# Patient Record
Sex: FEMALE | Race: WHITE | Employment: UNEMPLOYED | URBAN - METROPOLITAN AREA
[De-identification: names, ages, dates, MRNs, and addresses within clinical notes are randomized per-mention and may not be internally consistent; named-entity substitution may affect disease eponyms.]

---

## 2017-05-12 ENCOUNTER — TELEPHONE (OUTPATIENT)
Dept: INTERNAL MEDICINE CLINIC | Age: 57
End: 2017-05-12

## 2017-05-12 RX ORDER — ESTRADIOL 1 MG/1
1 TABLET ORAL DAILY
Qty: 30 TAB | Refills: 1 | Status: SHIPPED | OUTPATIENT
Start: 2017-05-12 | End: 2017-08-14 | Stop reason: SDUPTHER

## 2017-05-12 NOTE — TELEPHONE ENCOUNTER
Can cut estrace in 1/2 (take 0.5 mg).  Give it 1 month, then can try taking 1/2 pill every other day

## 2017-05-12 NOTE — TELEPHONE ENCOUNTER
Patient would like for the nurse to call her regarding getting off of estradiol (ESTRACE) 1 mg tablet  She wants to know if she has to winged off or can she just stop taking?  Her [phone] would like a call back

## 2017-05-14 RX ORDER — ESTRADIOL 1 MG/1
TABLET ORAL
Qty: 90 TAB | Refills: 3 | Status: SHIPPED | OUTPATIENT
Start: 2017-05-14 | End: 2017-09-06 | Stop reason: SDUPTHER

## 2017-08-14 NOTE — TELEPHONE ENCOUNTER
Forwarded refill request for estradiol to her GYN , Dr. Juliano Padilla. Possible interaction w lamictal (decreased effectiveness), but patient does not take lamictal for seizure d/o.

## 2017-08-15 RX ORDER — ESTRADIOL 1 MG/1
TABLET ORAL
Qty: 30 TAB | Refills: 4 | Status: SHIPPED | OUTPATIENT
Start: 2017-08-15 | End: 2018-01-22

## 2017-08-15 NOTE — TELEPHONE ENCOUNTER
Ashlie Gomez MD   You 5 hours ago (9:15 AM)                 Fill until December (Routing comment)

## 2017-08-15 NOTE — TELEPHONE ENCOUNTER
She is not due until December 2017, but Dr. Ken Mendoza who sent the request to you, refilled her for 1 year back on 5/14/2017. I am confused.

## 2017-09-06 RX ORDER — ESTRADIOL 1 MG/1
TABLET ORAL
Qty: 90 TAB | Refills: 3 | Status: SHIPPED | OUTPATIENT
Start: 2017-09-06 | End: 2018-01-22

## 2017-12-13 ENCOUNTER — TELEPHONE (OUTPATIENT)
Dept: OBGYN CLINIC | Age: 57
End: 2017-12-13

## 2017-12-13 NOTE — TELEPHONE ENCOUNTER
Call received at 11:35am      61year old patient last seen in the office on 12/15/16 and has next appointment for AE on 12/18/17. Patient calling to ask about whether she needs to be fasting to have HIV and STD testing done. This nurse advised patient that she did not need to be fasting for this lab work. Patient verbalized understanding.

## 2018-01-22 ENCOUNTER — OFFICE VISIT (OUTPATIENT)
Dept: OBGYN CLINIC | Age: 58
End: 2018-01-22

## 2018-01-22 VITALS
SYSTOLIC BLOOD PRESSURE: 144 MMHG | DIASTOLIC BLOOD PRESSURE: 90 MMHG | HEIGHT: 64 IN | WEIGHT: 186.6 LBS | BODY MASS INDEX: 31.86 KG/M2

## 2018-01-22 DIAGNOSIS — Z01.419 ENCOUNTER FOR GYNECOLOGICAL EXAMINATION WITHOUT ABNORMAL FINDING: Primary | ICD-10-CM

## 2018-01-22 DIAGNOSIS — Z11.3 SCREENING EXAMINATION FOR VENEREAL DISEASE: ICD-10-CM

## 2018-01-22 NOTE — PROGRESS NOTES
Danitza Sanchez is a ,  61 y.o. female [facility]– [place] whose No LMP recorded. Patient has had a hysterectomy. who presents for her annual checkup. She is having no significant problems. She requested STD testing, no concerns or symptoms. Hormone Status:    She is not having vasomotor symptoms. The patient is using HRT: none     Sexual history:    She  reports that she does not currently engage in sexual activity. Medical conditions:    Since her last annual GYN exam about 12/15/2016 ago, she has had the following changes in her health history: none. Pap and Mammogram History:    Her most recent Pap smear was normal/-HPV obtained 2014 year(s) ago. The patient had her mammogram today in our office. Breast Cancer History/Substance Abuse:    She has no family history of breast cancer. Osteoporosis History:    Family history does not include a first or second degree relative with osteopenia or osteoporosis. A bone density scan was not obtained. She is currently not taking calcium and vit D. Past Medical History:   Diagnosis Date    Abnormal Pap smear h/o per pt- unknown age    s/p HANSEL for fibroid. normal     Chronic anxiety     Dr. Hilbert Lombard.  Chronic depression     Dr. Hay Woodward H/O gastric bypass 2005    peak wt prior 312 lb, low 165 lb (?)    Hx of vertigo     did PT in the past    IFG (impaired fasting glucose)     a1c 5.8% 2013,     Left lumbar radiculopathy     xray showed DJD per pt    Neuropathy of foot     prior to gastric bypass. +FH,mildly painful, bilateral, stocking distribution, saw neurology in [place]    KEELEY on CPAP     improved w weight loss, but still uses CPAP    Tinnitus of both ears     for years     Past Surgical History:   Procedure Laterality Date    HX CHOLECYSTECTOMY      HX COLONOSCOPY  14    normal.  repeat 3 yrs +FH,  in [place] (3rd colonoscopy)    HX COLONOSCOPY  ?  HX COLONOSCOPY  ?     HX COLONOSCOPY 10/10/2016    normal    HX GASTRIC BYPASS  2005    HX HYSTERECTOMY  2011 Estil Schaumann pt    due to fibroids, still has ovaries    HX ORTHOPAEDIC      middle finger right hand    HX ORTHOPAEDIC  2013    fx fibula right leg    HX TONSILLECTOMY  1964     Tobacco History:  reports that she has never smoked. She has never used smokeless tobacco.  Alcohol Abuse:  reports that she drinks alcohol. Drug Abuse:  reports that she does not use illicit drugs. Current Outpatient Prescriptions   Medication Sig Dispense Refill    estradiol (ESTRACE) 1 mg tablet TAKE 1 TAB BY MOUTH DAILY. 90 Tab 3    estradiol (ESTRACE) 1 mg tablet TAKE 1 TAB BY MOUTH DAILY. 30 Tab 4    DULoxetine (CYMBALTA) 60 mg capsule Take 1 Cap by mouth two (2) times a day. 120 Cap 1    lamoTRIgine (LAMICTAL) 25 mg tablet Take 50 mg by mouth daily.  ASPIRIN (ASPIR-81 PO) Take  by mouth.  LORazepam (ATIVAN) 1 mg tablet Take 1 mg by mouth every four (4) hours as needed for Anxiety.  zolpidem (AMBIEN) 5 mg tablet Take  by mouth. 1/2 tab po every day PRN      MULTIVIT &MINERALS/FERROUS FUM (MULTI VITAMIN PO) Take  by mouth daily.  cyanocobalamin (VITAMIN B-12) 1,000 mcg sublingual tablet Take 1,000 mcg by mouth daily.  ASCORBATE CALCIUM (VITAMIN C PO) Take  by mouth.  zinc 50 mg tab tablet Take  by mouth daily.  ferrous sulfate (IRON) 325 mg (65 mg iron) tablet Take  by mouth Daily (before breakfast).  calcium-vitamin D 600 mg(1,500mg) -200 unit tab Take 1 tablet by mouth two (2) times daily (with meals).  BIOTIN PO Take  by mouth daily.  DOCOSAHEXANOIC ACID/EPA (FISH OIL PO) Take  by mouth daily.  clotrimazole-betamethasone (LOTRISONE) topical cream Apply  to affected area two (2) times a day.  15 g 0     Allergies: Prednisone and Wellbutrin [bupropion hcl]   Social History     Social History    Marital status: SINGLE     Spouse name:  2014    Number of children: 1    Years of education: N/A Occupational History    [employer]. Friend of Antony Sue      Social History Main Topics    Smoking status: Never Smoker    Smokeless tobacco: Never Used    Alcohol use Yes      Comment: less than one drink per week    Drug use: No    Sexual activity: Not Currently     Other Topics Concern    Not on file     Social History Narrative    Son in [place].  Age 32.  she  Moved to [facility] 2/2014     Patient Active Problem List   Diagnosis Code    Chronic anxiety F41.9    Chronic depression F32.9    KEELEY on CPAP G47.33, Z99.89    Neuropathy of foot G57.90    H/O gastric bypass Z98.890    IFG (impaired fasting glucose) R73.01    Left lumbar radiculopathy M54.16    Tinnitus of both ears H93.13       Review of Systems - History obtained from the patient  Constitutional: negative for weight loss, fever, night sweats  HEENT: negative for hearing loss, earache, congestion, snoring, sorethroat  CV: negative for chest pain, palpitations, edema  Resp: negative for cough, shortness of breath, wheezing  GI: negative for change in bowel habits, abdominal pain, black or bloody stools  : negative for frequency, dysuria, hematuria, vaginal discharge  MSK: negative for back pain, joint pain, muscle pain  Breast: negative for breast lumps, nipple discharge, galactorrhea  Skin :negative for itching, rash, hives  Neuro: negative for dizziness, headache, confusion, weakness  Psych: negative for anxiety, depression, change in mood  Heme/lymph: negative for bleeding, bruising, pallor    Physical Exam    Visit Vitals    /90    Ht 5' 4\" (1.626 m)    Wt 186 lb 9.6 oz (84.6 kg)    BMI 32.03 kg/m2     Constitutional  · Appearance: well-nourished, well developed, alert, in no acute distress    HENT  · Head and Face: appears normal    Neck  · Inspection/Palpation: normal appearance, no masses or tenderness  · Lymph Nodes: no lymphadenopathy present  · Thyroid: gland size normal, nontender, no nodules or masses present on palpation    Chest  · Respiratory Effort: breathing normal        Auscultation: normal breath sounds    Cardiovascular  · Heart:  · Auscultation: regular rate and rhythm without murmur    Breasts  · Inspection of Breasts: breasts symmetrical, no skin changes, no discharge present, nipple appearance normal, no skin retraction present  · Palpation of Breasts and Axillae: no masses present on palpation, no breast tenderness  · Axillary Lymph Nodes: no lymphadenopathy present    Gastrointestinal  · Abdominal Examination: abdomen non-tender to palpation, normal bowel sounds, no masses present  · Liver and spleen: no hepatomegaly present, spleen not palpable  · Hernias: no hernias identified    Genitourinary  · External Genitalia: normal appearance for age, no discharge present, no tenderness present, no inflammatory lesions present, no masses present, no atrophy present  · Vagina: normal vaginal vault without central or paravaginal defects, no discharge present, no inflammatory lesions present, no masses present  · Bladder: non-tender to palpation  · Urethra: appears normal  · Cervix: absent  · Uterus: absent  · Adnexa: no adnexal tenderness present, no adnexal masses present  · Perineum: perineum within normal limits, no evidence of trauma, no rashes or skin lesions present  · Anus: anus within normal limits, no hemorrhoids present  · Inguinal Lymph Nodes: no lymphadenopathy present      Skin  · General Inspection: no rash, no lesions identified    Neurologic/Psychiatric  · Mental Status:  · Orientation: grossly oriented to person, place and time  · Mood and Affect: mood normal, affect appropriate    . Assessment:  Routine gynecologic examination  Her current medical status is satisfactory with no evidence of significant gynecologic issues.     Plan:  Counseled re: diet, exercise, healthy lifestyle  Return for yearly wellness visits  Rec annual mammogram  STD testing by request

## 2018-01-22 NOTE — PATIENT INSTRUCTIONS
Breast Self-Exam: Care Instructions  Your Care Instructions    A breast self-exam is when you check your breasts for lumps or changes. This regular exam helps you learn how your breasts normally look and feel. Most breast problems or changes are not because of cancer. Breast self-exam is not a substitute for a mammogram. Having regular breast exams by your doctor and regular mammograms improve your chances of finding any problems with your breasts. Some women set a time each month to do a step-by-step breast self-exam. Other women like a less formal system. They might look at their breasts as they brush their teeth, or feel their breasts once in a while in the shower. If you notice a change in your breast, tell your doctor. Follow-up care is a key part of your treatment and safety. Be sure to make and go to all appointments, and call your doctor if you are having problems. It's also a good idea to know your test results and keep a list of the medicines you take. How do you do a breast self-exam?  · The best time to examine your breasts is usually one week after your menstrual period begins. Your breasts should not be tender then. If you do not have periods, you might do your exam on a day of the month that is easy to remember. · To examine your breasts:  ¨ Remove all your clothes above the waist and lie down. When you are lying down, your breast tissue spreads evenly over your chest wall, which makes it easier to feel all your breast tissue. ¨ Use the pads-not the fingertips-of the 3 middle fingers of your left hand to check your right breast. Move your fingers slowly in small coin-sized circles that overlap. ¨ Use three levels of pressure to feel of all your breast tissue. Use light pressure to feel the tissue close to the skin surface. Use medium pressure to feel a little deeper. Use firm pressure to feel your tissue close to your breastbone and ribs.  Use each pressure level to feel your breast tissue before moving on to the next spot. ¨ Check your entire breast, moving up and down as if following a strip from the collarbone to the bra line, and from the armpit to the ribs. Repeat until you have covered the entire breast.  ¨ Repeat this procedure for your left breast, using the pads of the 3 middle fingers of your right hand. · To examine your breasts while in the shower:  ¨ Place one arm over your head and lightly soap your breast on that side. ¨ Using the pads of your fingers, gently move your hand over your breast (in the strip pattern described above), feeling carefully for any lumps or changes. ¨ Repeat for the other breast.  · Have your doctor inspect anything you notice to see if you need further testing. Where can you learn more? Go to http://sascha-hunter.info/. Enter P148 in the search box to learn more about \"Breast Self-Exam: Care Instructions. \"  Current as of: May 12, 2017  Content Version: 11.4  © 3060-1000 [employer]. Care instructions adapted under license by [employer] (which disclaims liability or warranty for this information). If you have questions about a medical condition or this instruction, always ask your healthcare professional. [place] [address] any warranty or liability for your use of this information.

## 2018-01-23 LAB
COMMENT, 144067: NORMAL
HBV SURFACE AG SERPL QL IA: NEGATIVE
HCV AB S/CO SERPL IA: <0.1 S/CO RATIO (ref 0–0.9)
HIV 1+2 AB+HIV1 P24 AG SERPL QL IA: NON REACTIVE
HSV2 IGG SER IA-ACNC: <0.91 INDEX (ref 0–0.9)
RPR SER QL: NON REACTIVE

## 2018-01-24 LAB
C TRACH RRNA SPEC QL NAA+PROBE: NEGATIVE
N GONORRHOEA RRNA SPEC QL NAA+PROBE: NEGATIVE
T VAGINALIS RRNA SPEC QL NAA+PROBE: NEGATIVE

## 2018-01-25 ENCOUNTER — TELEPHONE (OUTPATIENT)
Dept: OBGYN CLINIC | Age: 58
End: 2018-01-25

## 2018-01-25 NOTE — TELEPHONE ENCOUNTER
Called and spoke with patient regarding STD blood work and GC culture results--all normal/negative. Pt states understanding.

## 2018-01-25 NOTE — TELEPHONE ENCOUNTER
----- Message from Jewels Marshall sent at 1/25/2018  8:24 AM EST -----  Regarding: RE:[facility] After Visit Follow-Up Message. Contact: [phone]  I would like to review my test results with you. I can be reached [phone]   Thanks. ... Precious Fabian  ----- Message -----  From: Russell Elkins MD  Sent: 1/25/2018 12:00 AM EST  To: Sj Coffey.   Subject: [facility] After Visit Follow-Up Message. Amy Ms. Snyder,    Thank you for your recent visit to [id number] [place] [address]. Your health is important to us and we are privileged to be your healthcare provider and partner. If you have follow-up questions regarding your treatment plan from todays visit, please contact us through the [facility] Patient Portal by replying to this message. In the near future, you may receive a survey about your experience with us. We hope you will take the time to let us know how we did so we can continue to improve the care you receive.       Thank you,    Melody Salinas Ob-gyn

## 2018-03-02 ENCOUNTER — OFFICE VISIT (OUTPATIENT)
Dept: INTERNAL MEDICINE CLINIC | Age: 58
End: 2018-03-02

## 2018-03-02 VITALS
TEMPERATURE: 99.1 F | BODY MASS INDEX: 31.58 KG/M2 | HEART RATE: 98 BPM | SYSTOLIC BLOOD PRESSURE: 199 MMHG | OXYGEN SATURATION: 98 % | DIASTOLIC BLOOD PRESSURE: 108 MMHG | WEIGHT: 185 LBS | HEIGHT: 64 IN | RESPIRATION RATE: 16 BRPM

## 2018-03-02 DIAGNOSIS — I10 HTN (HYPERTENSION), MALIGNANT: Primary | ICD-10-CM

## 2018-03-02 RX ORDER — LISINOPRIL AND HYDROCHLOROTHIAZIDE 10; 12.5 MG/1; MG/1
1 TABLET ORAL DAILY
Qty: 30 TAB | Refills: 2 | Status: SHIPPED | OUTPATIENT
Start: 2018-03-02 | End: 2018-03-28 | Stop reason: ALTCHOICE

## 2018-03-02 RX ORDER — DULOXETIN HYDROCHLORIDE 60 MG/1
120 CAPSULE, DELAYED RELEASE ORAL DAILY
COMMUNITY
End: 2019-05-20 | Stop reason: ALTCHOICE

## 2018-03-02 RX ORDER — TRAZODONE HYDROCHLORIDE 50 MG/1
1 TABLET ORAL AS NEEDED
COMMUNITY
Start: 2018-02-22

## 2018-03-02 RX ORDER — LAMOTRIGINE 100 MG/1
200 TABLET ORAL DAILY
Refills: 0 | COMMUNITY
Start: 2017-11-27 | End: 2021-02-10 | Stop reason: ALTCHOICE

## 2018-03-02 NOTE — PATIENT INSTRUCTIONS
High Blood Pressure: Care Instructions  Your Care Instructions    If your blood pressure is usually above 140/90, you have high blood pressure, or hypertension. That means the top number is 140 or higher or the bottom number is 90 or higher, or both. Despite what a lot of people think, high blood pressure usually doesn't cause headaches or make you feel dizzy or lightheaded. It usually has no symptoms. But it does increase your risk for heart attack, stroke, and kidney or eye damage. The higher your blood pressure, the more your risk increases. Your doctor will give you a goal for your blood pressure. Your goal will be based on your health and your age. An example of a goal is to keep your blood pressure below 140/90. Lifestyle changes, such as eating healthy and being active, are always important to help lower blood pressure. You might also take medicine to reach your blood pressure goal.  Follow-up care is a key part of your treatment and safety. Be sure to make and go to all appointments, and call your doctor if you are having problems. It's also a good idea to know your test results and keep a list of the medicines you take. How can you care for yourself at home? Medical treatment  · If you stop taking your medicine, your blood pressure will go back up. You may take one or more types of medicine to lower your blood pressure. Be safe with medicines. Take your medicine exactly as prescribed. Call your doctor if you think you are having a problem with your medicine. · Talk to your doctor before you start taking aspirin every day. Aspirin can help certain people lower their risk of a heart attack or stroke. But taking aspirin isn't right for everyone, because it can cause serious bleeding. · See your doctor regularly. You may need to see the doctor more often at first or until your blood pressure comes down.   · If you are taking blood pressure medicine, talk to your doctor before you take decongestants or anti-inflammatory medicine, such as ibuprofen. Some of these medicines can raise blood pressure. · Learn how to check your blood pressure at home. Lifestyle changes  · Stay at a healthy weight. This is especially important if you put on weight around the waist. Losing even 10 pounds can help you lower your blood pressure. · If your doctor recommends it, get more exercise. Walking is a good choice. Bit by bit, increase the amount you walk every day. Try for at least 30 minutes on most days of the week. You also may want to swim, bike, or do other activities. · Avoid or limit alcohol. Talk to your doctor about whether you can drink any alcohol. · Try to limit how much sodium you eat to less than 2,300 milligrams (mg) a day. Your doctor may ask you to try to eat less than 1,500 mg a day. · Eat plenty of fruits (such as bananas and oranges), vegetables, legumes, whole grains, and low-fat dairy products. · Lower the amount of saturated fat in your diet. Saturated fat is found in animal products such as milk, cheese, and meat. Limiting these foods may help you lose weight and also lower your risk for heart disease. · Do not smoke. Smoking increases your risk for heart attack and stroke. If you need help quitting, talk to your doctor about stop-smoking programs and medicines. These can increase your chances of quitting for good. When should you call for help? Call 911 anytime you think you may need emergency care. This may mean having symptoms that suggest that your blood pressure is causing a serious heart or blood vessel problem. Your blood pressure may be over 180/110. ? For example, call 911 if:  ? · You have symptoms of a heart attack. These may include:  ¨ Chest pain or pressure, or a strange feeling in the chest.  ¨ Sweating. ¨ Shortness of breath. ¨ Nausea or vomiting.   ¨ Pain, pressure, or a strange feeling in the back, neck, jaw, or upper belly or in one or both shoulders or arms.  ¨ Lightheadedness or sudden weakness. ¨ A fast or irregular heartbeat. ? · You have symptoms of a stroke. These may include:  ¨ Sudden numbness, tingling, weakness, or loss of movement in your face, arm, or leg, especially on only one side of your body. ¨ Sudden vision changes. ¨ Sudden trouble speaking. ¨ Sudden confusion or trouble understanding simple statements. ¨ Sudden problems with walking or balance. ¨ A sudden, severe headache that is different from past headaches. ? · You have severe back or belly pain. ?Do not wait until your blood pressure comes down on its own. Get help right away. ?Call your doctor now or seek immediate care if:  ? · Your blood pressure is much higher than normal (such as 180/110 or higher), but you don't have symptoms. ? · You think high blood pressure is causing symptoms, such as:  ¨ Severe headache. ¨ Blurry vision. ? Watch closely for changes in your health, and be sure to contact your doctor if:  ? · Your blood pressure measures 140/90 or higher at least 2 times. That means the top number is 140 or higher or the bottom number is 90 or higher, or both. ? · You think you may be having side effects from your blood pressure medicine. ? · Your blood pressure is usually normal, but it goes above normal at least 2 times. Where can you learn more? Go to http://sascha-hunter.info/. Enter E633 in the search box to learn more about \"High Blood Pressure: Care Instructions. \"  Current as of: September 21, 2016  Content Version: 11.4  © 5218-0857 [employer]. Care instructions adapted under license by [employer] (which disclaims liability or warranty for this information). If you have questions about a medical condition or this instruction, always ask your healthcare professional. [place] [address] any warranty or liability for your use of this information.

## 2018-03-02 NOTE — MR AVS SNAPSHOT
[facility] 
 
 
 [address]  [place] [address] [facility] 
[phone] Patient: Moy Dodge MRN: [id number] [id number] Visit Information Date & Time Provider Department Dept. Phone Encounter #  
 3/2/2018  2:45 PM Rashi Laboy MD [facility] [address] [facility] [id number] Your Appointments 3/28/2018  2:45 PM  
Complete Physical with Anjel Diaz MD  
[facility] App[facility] Note: CPE, STD Testing, AB, 11/22/17; CPE, STD Testing, AB, 11/22/17   r/s from 02/01/2018  julia  
 [address]  [id number]  
[phone]  
  
   
 [address] [facility] [address] Upcoming Health Maintenance Date Due Influenza Age 5 to Adult 8/1/2017 DTaP/Tdap/Td series (1 - Tdap) 7/2/2023* BREAST CANCER SCRN MAMMOGRAM 1/22/2019 COLONOSCOPY 10/10/2019 PAP AKA CERVICAL CYTOLOGY 12/12/2019 *Topic was postponed. The date shown is not the original due date. Allergies as of 3/2/2018  Review Complete On: 3/2/2018 By: Geraldine Smith LPN Severity Noted Reaction Type Reactions Prednisone  11/26/2014    Other (comments) Pt feels like she has an \"out of body\" experience Wellbutrin [Bupropion Hcl]  12/14/2015    Palpitations Current Immunizations  Reviewed on 12/15/2016 Name Date Influenza Vaccine 9/14/2016, 9/14/2016, 10/7/2015, 9/25/2014 Td 7/2/2013 Not reviewed this visit You Were Diagnosed With   
  
 Codes Comments HTN (hypertension), malignant    -  Primary ICD-10-CM: I10 
ICD-9-CM: 401.0 Vitals BP Pulse Temp Resp Height(growth percentile) Weight(growth percentile) (!) 199/108 (BP 1 Location: Left arm, BP Patient Position: Sitting) 98 99.1 °F (37.3 °C) (Oral) 16 5' 4\" (1.626 m) 185 lb (83.9 kg) SpO2 BMI OB Status Smoking Status 98% 31.76 kg/m2 Hysterectomy Never Smoker Vitals History BMI and BSA Data Body Mass Index Body Surface Area 31.76 kg/m 2 1.95 m 2 Preferred Pharmacy [facility] Phone [facility] [address] [phone] Your Updated Medication List  
  
   
This list is accurate as of 3/2/18  3:36 PM.  Always use your most recent med list.  
  
  
  
  
 ASPIR-81 PO Take  by mouth. ATIVAN 1 mg tablet Generic drug:  LORazepam  
Take 1 mg by mouth every four (4) hours as needed for Anxiety. BIOTIN PO Take  by mouth daily. calcium-vitamin D 600 mg(1,500mg) -200 unit Tab Take 1 tablet by mouth two (2) times daily (with meals). CYMBALTA 60 mg capsule Generic drug:  DULoxetine Take 120 mg by mouth daily. FISH OIL PO Take  by mouth daily. Iron 325 mg (65 mg iron) tablet Generic drug:  ferrous sulfate Take  by mouth Daily (before breakfast). lamoTRIgine 100 mg tablet Commonly known as: LaMICtal  
Take 1.5 Tabs by mouth daily. lisinopril-hydroCHLOROthiazide 10-12.5 mg per tablet Commonly known as:  Nicholaus Cable Take 1 Tab by mouth daily. MULTI VITAMIN PO Take  by mouth daily. PROBIOTIC PO Take  by mouth. traZODone 50 mg tablet Commonly known as:  Jayro Gupta Take 1 Tab by mouth daily. VITAMIN B-12 1,000 mcg sublingual tablet Generic drug:  cyanocobalamin Take 1,000 mcg by mouth daily. VITAMIN C PO Take  by mouth. zinc 50 mg Tab tablet Take  by mouth daily. Prescriptions Sent to Pharmacy Refills  
 lisinopril-hydroCHLOROthiazide (PRINZIDE, ZESTORETIC) 10-12.5 mg per tablet 2 Sig: Take 1 Tab by mouth daily. Class: Normal  
 Pharmacy: [facility], [address] [place] [address] Ph #: [phone] Route: Oral  
  
We Performed the Following METABOLIC PANEL, COMPREHENSIVE [78871 CPT(R)] Patient Instructions High Blood Pressure: Care Instructions Your Care Instructions If your blood pressure is usually above 140/90, you have high blood pressure, or hypertension. That means the top number is 140 or higher or the bottom number is 90 or higher, or both. Despite what a lot of people think, high blood pressure usually doesn't cause headaches or make you feel dizzy or lightheaded. It usually has no symptoms. But it does increase your risk for heart attack, stroke, and kidney or eye damage. The higher your blood pressure, the more your risk increases. Your doctor will give you a goal for your blood pressure. Your goal will be based on your health and your age. An example of a goal is to keep your blood pressure below 140/90. Lifestyle changes, such as eating healthy and being active, are always important to help lower blood pressure. You might also take medicine to reach your blood pressure goal. 
Follow-up care is a key part of your treatment and safety. Be sure to make and go to all appointments, and call your doctor if you are having problems. It's also a good idea to know your test results and keep a list of the medicines you take. How can you care for yourself at home? Medical treatment · If you stop taking your medicine, your blood pressure will go back up. You may take one or more types of medicine to lower your blood pressure. Be safe with medicines. Take your medicine exactly as prescribed. Call your doctor if you think you are having a problem with your medicine. · Talk to your doctor before you start taking aspirin every day. Aspirin can help certain people lower their risk of a heart attack or stroke. But taking aspirin isn't right for everyone, because it can cause serious bleeding. · See your doctor regularly. You may need to see the doctor more often at first or until your blood pressure comes down.  
· If you are taking blood pressure medicine, talk to your doctor before you take decongestants or anti-inflammatory medicine, such as ibuprofen. Some of these medicines can raise blood pressure. · Learn how to check your blood pressure at home. Lifestyle changes · Stay at a healthy weight. This is especially important if you put on weight around the waist. Losing even 10 pounds can help you lower your blood pressure. · If your doctor recommends it, get more exercise. Walking is a good choice. Bit by bit, increase the amount you walk every day. Try for at least 30 minutes on most days of the week. You also may want to swim, bike, or do other activities. · Avoid or limit alcohol. Talk to your doctor about whether you can drink any alcohol. · Try to limit how much sodium you eat to less than 2,300 milligrams (mg) a day. Your doctor may ask you to try to eat less than 1,500 mg a day. · Eat plenty of fruits (such as bananas and oranges), vegetables, legumes, whole grains, and low-fat dairy products. · Lower the amount of saturated fat in your diet. Saturated fat is found in animal products such as milk, cheese, and meat. Limiting these foods may help you lose weight and also lower your risk for heart disease. · Do not smoke. Smoking increases your risk for heart attack and stroke. If you need help quitting, talk to your doctor about stop-smoking programs and medicines. These can increase your chances of quitting for good. When should you call for help? Call 911 anytime you think you may need emergency care. This may mean having symptoms that suggest that your blood pressure is causing a serious heart or blood vessel problem. Your blood pressure may be over 180/110. ? For example, call 911 if: 
? · You have symptoms of a heart attack. These may include: ¨ Chest pain or pressure, or a strange feeling in the chest. 
¨ Sweating. ¨ Shortness of breath. ¨ Nausea or vomiting.  
¨ Pain, pressure, or a strange feeling in the back, neck, jaw, or upper belly or in one or both shoulders or arms. ¨ Lightheadedness or sudden weakness. ¨ A fast or irregular heartbeat. ? · You have symptoms of a stroke. These may include: 
¨ Sudden numbness, tingling, weakness, or loss of movement in your face, arm, or leg, especially on only one side of your body. ¨ Sudden vision changes. ¨ Sudden trouble speaking. ¨ Sudden confusion or trouble understanding simple statements. ¨ Sudden problems with walking or balance. ¨ A sudden, severe headache that is different from past headaches. ? · You have severe back or belly pain. ?Do not wait until your blood pressure comes down on its own. Get help right away. ?Call your doctor now or seek immediate care if: 
? · Your blood pressure is much higher than normal (such as 180/110 or higher), but you don't have symptoms. ? · You think high blood pressure is causing symptoms, such as: ¨ Severe headache. ¨ Blurry vision. ? Watch closely for changes in your health, and be sure to contact your doctor if: 
? · Your blood pressure measures 140/90 or higher at least 2 times. That means the top number is 140 or higher or the bottom number is 90 or higher, or both. ? · You think you may be having side effects from your blood pressure medicine. ? · Your blood pressure is usually normal, but it goes above normal at least 2 times. Where can you learn more? Go to http://sascha-hunter.info/. Enter R082 in the search box to learn more about \"High Blood Pressure: Care Instructions. \" Current as of: September 21, 2016 Content Version: 11.4 © 6999-0560 [employer]. Care instructions adapted under license by [employer] (which disclaims liability or warranty for this information). If you have questions about a medical condition or this instruction, always ask your healthcare professional. [address] any warranty or liability for your use of this information. Introducing [facility] & HEALTH SERVICES! Dear Sandra Zuniga: Thank you for requesting a [employer] account. Our records indicate that you already have an active [employer] account. You can access your account anytime at [email or web address] Did you know that you can access your hospital and ER discharge instructions at any time in [employer]? You can also review all of your test results from your hospital stay or ER visit. Additional Information If you have questions, please visit the Frequently Asked Questions section of the [employer] website at [email or web address] Remember, [employer] is NOT to be used for urgent needs. For medical emergencies, dial 911. Now available from your iPhone and Android! Please provide this summary of care documentation to your next provider. Your primary care clinician is listed as Katie Gomez. If you have any questions after today's visit, please call [phone].

## 2018-03-03 LAB
ALBUMIN SERPL-MCNC: 4.7 G/DL (ref 3.5–5.5)
ALBUMIN/GLOB SERPL: 2.1 {RATIO} (ref 1.2–2.2)
ALP SERPL-CCNC: 72 IU/L (ref 39–117)
ALT SERPL-CCNC: 18 IU/L (ref 0–32)
AST SERPL-CCNC: 28 IU/L (ref 0–40)
BILIRUB SERPL-MCNC: 0.7 MG/DL (ref 0–1.2)
BUN SERPL-MCNC: 16 MG/DL (ref 6–24)
BUN/CREAT SERPL: 20 (ref 9–23)
CALCIUM SERPL-MCNC: 10 MG/DL (ref 8.7–10.2)
CHLORIDE SERPL-SCNC: 98 MMOL/L (ref 96–106)
CO2 SERPL-SCNC: 27 MMOL/L (ref 18–29)
CREAT SERPL-MCNC: 0.79 MG/DL (ref 0.57–1)
GFR SERPLBLD CREATININE-BSD FMLA CKD-EPI: 83 ML/MIN/1.73
GFR SERPLBLD CREATININE-BSD FMLA CKD-EPI: 95 ML/MIN/1.73
GLOBULIN SER CALC-MCNC: 2.2 G/DL (ref 1.5–4.5)
GLUCOSE SERPL-MCNC: 94 MG/DL (ref 65–99)
POTASSIUM SERPL-SCNC: 4 MMOL/L (ref 3.5–5.2)
PROT SERPL-MCNC: 6.9 G/DL (ref 6–8.5)
SODIUM SERPL-SCNC: 144 MMOL/L (ref 134–144)

## 2018-03-12 ENCOUNTER — OFFICE VISIT (OUTPATIENT)
Dept: INTERNAL MEDICINE CLINIC | Age: 58
End: 2018-03-12

## 2018-03-12 VITALS
RESPIRATION RATE: 16 BRPM | BODY MASS INDEX: 31.24 KG/M2 | WEIGHT: 183 LBS | HEART RATE: 87 BPM | DIASTOLIC BLOOD PRESSURE: 90 MMHG | TEMPERATURE: 98.4 F | OXYGEN SATURATION: 98 % | SYSTOLIC BLOOD PRESSURE: 170 MMHG | HEIGHT: 64 IN

## 2018-03-12 DIAGNOSIS — I10 HTN, GOAL BELOW 130/80: Primary | ICD-10-CM

## 2018-03-12 NOTE — PROGRESS NOTES
HISTORY OF PRESENT ILLNESS  Nohelia Ferreira is a 61 y.o. female. HPI  Follow up after starting Lisinopril/HCTZ 10/12.5 daily. She has tolerated it without cough. She notes a little bit of lightheadedness when she first gets to stand up, but this does not persist during the day. No chest pain or shortness of breath. Her blood pressure started off in the 200/100 range, they are not 170/90 here, at home her readings are 130-150/70-90. We have discussed having her home machine checked to be sure it is accurate. Discussed this is a good response in ten days and she's going to continue on the current dose. Discussed low salt diet. Review of Systems   Constitutional: Negative for chills, fever and weight loss. Respiratory: Negative for cough, shortness of breath and wheezing. Cardiovascular: Negative for chest pain, palpitations, orthopnea, leg swelling and PND. Gastrointestinal: Negative for heartburn and nausea. Musculoskeletal: Negative for myalgias. Neurological: Positive for dizziness. Negative for headaches. Physical Exam   Constitutional: She is oriented to person, place, and time. She appears well-developed and well-nourished. HENT:   Head: Normocephalic and atraumatic. Neck: Normal range of motion. Neck supple. Carotid bruit is not present. No thyromegaly present. Cardiovascular: Normal rate, regular rhythm, S1 normal, S2 normal, normal heart sounds and intact distal pulses. No murmur heard. Pulmonary/Chest: Effort normal and breath sounds normal. No respiratory distress. She has no wheezes. She has no rales. Musculoskeletal: She exhibits no edema. Neurological: She is alert and oriented to person, place, and time. Psychiatric: She has a normal mood and affect. Her behavior is normal.   Nursing note and vitals reviewed.       ASSESSMENT and PLAN  Diagnoses and all orders for this visit:    1. HTN, goal below 130/80    Cont prinzide 10/12.5 as already significant improvement in 10 days of use  cotn dash diet  appt in 6 weeks with dr seymour

## 2018-03-28 ENCOUNTER — OFFICE VISIT (OUTPATIENT)
Dept: INTERNAL MEDICINE CLINIC | Age: 58
End: 2018-03-28

## 2018-03-28 VITALS
TEMPERATURE: 98.1 F | HEIGHT: 64 IN | WEIGHT: 181 LBS | SYSTOLIC BLOOD PRESSURE: 147 MMHG | RESPIRATION RATE: 12 BRPM | HEART RATE: 93 BPM | BODY MASS INDEX: 30.9 KG/M2 | DIASTOLIC BLOOD PRESSURE: 82 MMHG

## 2018-03-28 DIAGNOSIS — Z98.84 H/O GASTRIC BYPASS: ICD-10-CM

## 2018-03-28 DIAGNOSIS — G47.33 OSA ON CPAP: ICD-10-CM

## 2018-03-28 DIAGNOSIS — I10 BENIGN ESSENTIAL HTN: ICD-10-CM

## 2018-03-28 DIAGNOSIS — Z99.89 OSA ON CPAP: ICD-10-CM

## 2018-03-28 DIAGNOSIS — Z00.00 WELL WOMAN EXAM (NO GYNECOLOGICAL EXAM): Primary | ICD-10-CM

## 2018-03-28 DIAGNOSIS — R73.01 IFG (IMPAIRED FASTING GLUCOSE): ICD-10-CM

## 2018-03-28 RX ORDER — LISINOPRIL AND HYDROCHLOROTHIAZIDE 12.5; 2 MG/1; MG/1
1 TABLET ORAL DAILY
Qty: 90 TAB | Refills: 0 | Status: SHIPPED | OUTPATIENT
Start: 2018-03-28 | End: 2018-06-12 | Stop reason: SDUPTHER

## 2018-03-28 NOTE — MR AVS SNAPSHOT
[address] [place] [address] 
 
 
 [address] [place] [address] 
[phone] Patient: Richard Serrano MRN: [id number] VZO:5/50/[id number] Visit Information Date & Time Provider Department Dept. Phone Encounter #  
 3/28/2018  2:45 PM Abel Lee MD [facility] [address]  [id number] Upcoming Health Maintenance Date Due Influenza Age 5 to Adult 9/1/2018* DTaP/Tdap/Td series (1 - Tdap) 7/2/2023* BREAST CANCER SCRN MAMMOGRAM 1/22/2019 PAP AKA CERVICAL CYTOLOGY 12/12/2019 COLONOSCOPY 10/10/2021 *Topic was postponed. The date shown is not the original due date. Allergies as of 3/28/2018  Review Complete On: 3/28/2018 By: Abel Lee MD  
  
 Severity Noted Reaction Type Reactions Prednisone  11/26/2014    Other (comments) Pt feels like she has an \"out of body\" experience Wellbutrin [Bupropion Hcl]  12/14/2015    Palpitations Current Immunizations  Reviewed on 12/15/2016 Name Date Influenza Vaccine 9/14/2016, 9/14/2016, 10/7/2015, 9/25/2014 Td 7/2/2013 Not reviewed this visit You Were Diagnosed With   
  
 Codes Comments Well woman exam (no gynecological exam)    -  Primary ICD-10-CM: Z00.00 ICD-9-CM: V70.0 H/O gastric bypass     ICD-10-CM: Z98.890 ICD-9-CM: V45.86 Benign essential HTN     ICD-10-CM: I10 
ICD-9-CM: 401.1 KEELEY on CPAP     ICD-10-CM: G47.33, Z99.89 ICD-9-CM: 327.23, V46.8 IFG (impaired fasting glucose)     ICD-10-CM: R73.01 
ICD-9-CM: 790.21 Vitals BP Pulse Temp Resp Height(growth percentile) Weight(growth percentile) 147/82 (BP 1 Location: Left arm, BP Patient Position: Sitting) 93 98.1 °F (36.7 °C) (Oral) 12 5' 4\" (1.626 m) 181 lb (82.1 kg) BMI OB Status Smoking Status 31.07 kg/m2 Hysterectomy Never Smoker Vitals History BMI and BSA Data Body Mass Index Body Surface Area  31.07 kg/m 2 1.93 m 2  
  
  
 Preferred Pharmacy [facility] Phone [facility] [address] [place], [address] [facility] [phone] Your Updated Medication List  
  
   
This list is accurate as of 3/28/18  3:35 PM.  Always use your most recent med list.  
  
  
  
  
 ASPIR-81 PO Take  by mouth. ATIVAN 1 mg tablet Generic drug:  LORazepam  
Take 1 mg by mouth every four (4) hours as needed for Anxiety. BIOTIN PO Take  by mouth daily. calcium-vitamin D 600 mg(1,500mg) -200 unit Tab Take 1 tablet by mouth two (2) times daily (with meals). CYMBALTA 60 mg capsule Generic drug:  DULoxetine Take 120 mg by mouth daily. FISH OIL PO Take  by mouth daily. Iron 325 mg (65 mg iron) tablet Generic drug:  ferrous sulfate Take  by mouth Daily (before breakfast). lamoTRIgine 100 mg tablet Commonly known as: LaMICtal  
Take 1.5 Tabs by mouth daily. lisinopril-hydroCHLOROthiazide 20-12.5 mg per tablet Commonly known as:  Albertine Jeanerette Take 1 Tab by mouth daily. Change in dose 3/28/18 MULTI VITAMIN PO Take  by mouth daily. PROBIOTIC PO Take  by mouth. traZODone 50 mg tablet Commonly known as:  Mosetta Volodymyr Take 1 Tab by mouth daily. VITAMIN B-12 1,000 mcg sublingual tablet Generic drug:  cyanocobalamin Take 1,000 mcg by mouth daily. VITAMIN C PO Take  by mouth. zinc 50 mg Tab tablet Take  by mouth daily. Prescriptions Sent to Pharmacy Refills  
 lisinopril-hydroCHLOROthiazide (PRINZIDE, ZESTORETIC) 20-12.5 mg per tablet 0 Sig: Take 1 Tab by mouth daily. Change in dose 3/28/18 Class: Normal  
 Pharmacy: [facility], [address] [facility] Ph #: [phone] Route: Oral  
  
We Performed the Following CBC W/O DIFF [28008 CPT(R)] COPPER [id number] CPT(R)] HEMOGLOBIN A1C WITH EAG [17374 CPT(R)] IRON PROFILE [id number] CPT(R)] LIPID PANEL [06844 CPT(R)] METABOLIC PANEL, BASIC [75731 CPT(R)] PTH INTACT [77346 CPT(R)] REFERRAL TO SLEEP STUDIES [REF99 Custom] VITAMIN B12 & FOLATE [10666 CPT(R)] VITAMIN D, 25 HYDROXY [id number] CPT(R)] ZINC [id number] CPT(R)] Referral Information Referral ID Referred By Referred To  
  
 [id number] Marisabel GUEVARA [facility] [address] Yovanny Walters 33 Visits Status Start Date End Date 1 New Request 3/28/18 3/28/19 If your referral has a status of pending review or denied, additional information will be sent to support the outcome of this decision. Introducing [facility] & HEALTH SERVICES! Dear Isaiah Corbett: Thank you for requesting a quickhuddle account. Our records indicate that you already have an active quickhuddle account. You can access your account anytime at [email or web address] Did you know that you can access your hospital and ER discharge instructions at any time in quickhuddle? You can also review all of your test results from your hospital stay or ER visit. Additional Information If you have questions, please visit the Frequently Asked Questions section of the quickhuddle website at [email or web address] Remember, quickhuddle is NOT to be used for urgent needs. For medical emergencies, dial 911. Now available from your iPhone and Android! Please provide this summary of care documentation to your next provider. Your primary care clinician is listed as Sadie Elizondo. If you have any questions after today's visit, please call [phone].

## 2018-03-28 NOTE — PROGRESS NOTES
Kai Thibodeaux is a 61 y.o. female  Presenting for her annual checkup and follow-up    Was out of of work 10/2017-18. 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 Hypertension  dx w accelerated NTN 1 month ago. Taking lisinopril-HCTZ. Reports mild dizziness. Was drinking more, but cut back to biw 2 drinks. She was using her sister's CPAP, but it  8 weeks ago. Hypertension ROS: taking medications as instructed, no medication side effects noted, no TIA's, no chest pain on exertion, no dyspnea on exertion, no swelling of ankles     reports that she has never smoked. She has never used smokeless tobacco.    reports that she drinks alcohol. BP Readings from Last 2 Encounters:   18 147/82   18 170/90     Last DEXA:   Health Maintenance   Topic Date Due    Influenza Age 5 to Adult  2018 (Originally 2017)    DTaP/Tdap/Td series (1 - Tdap) 2023 (Originally 7/3/2013)   [facility] MAMMOGRAM  2019    PAP AKA CERVICAL CYTOLOGY  2019    COLONOSCOPY  10/10/2021    Hepatitis C Screening  Completed       Exercise: moderately active  Diet: generally follows a low fat low cholesterol diet    Vaccinations reviewed  Immunization History   Administered Date(s) Administered    Influenza Vaccine 2014, 10/07/2015, 2016, 2016    Td 2013       Allergies: Prednisone and Wellbutrin [bupropion hcl]  Current Outpatient Prescriptions   Medication Sig    DULoxetine (CYMBALTA) 60 mg capsule Take 120 mg by mouth daily.  lamoTRIgine (LAMICTAL) 100 mg tablet Take 1.5 Tabs by mouth daily.  traZODone (DESYREL) 50 mg tablet Take 1 Tab by mouth daily.  LACTOBACILLUS ACIDOPHILUS (PROBIOTIC PO) Take  by mouth.  lisinopril-hydroCHLOROthiazide (PRINZIDE, ZESTORETIC) 10-12.5 mg per tablet Take 1 Tab by mouth daily.  ASPIRIN (ASPIR-81 PO) Take  by mouth.     LORazepam (ATIVAN) 1 mg tablet Take 1 mg by mouth every four (4) hours as needed for Anxiety.  MULTIVIT &MINERALS/FERROUS FUM (MULTI VITAMIN PO) Take  by mouth daily.  cyanocobalamin (VITAMIN B-12) 1,000 mcg sublingual tablet Take 1,000 mcg by mouth daily.  ASCORBATE CALCIUM (VITAMIN C PO) Take  by mouth.  zinc 50 mg tab tablet Take  by mouth daily.  ferrous sulfate (IRON) 325 mg (65 mg iron) tablet Take  by mouth Daily (before breakfast).  calcium-vitamin D 600 mg(1,500mg) -200 unit tab Take 1 tablet by mouth two (2) times daily (with meals).  BIOTIN PO Take  by mouth daily.  DOCOSAHEXANOIC ACID/EPA (FISH OIL PO) Take  by mouth daily. No current facility-administered medications for this visit. has a past medical history of Abnormal Pap smear (h/o per pt- unknown age); Benign essential HTN (3/28/2018); Chronic anxiety; Chronic depression; H/O gastric bypass (2005); vertigo; IFG (impaired fasting glucose); Left lumbar radiculopathy; Neuropathy of foot; KEELEY on CPAP; and Tinnitus of both ears. Past Surgical History:   Procedure Laterality Date    HX CHOLECYSTECTOMY  2009    HX COLONOSCOPY  1/22/14    normal.  repeat 3 yrs +,  in [place] (3rd colonoscopy)    HX COLONOSCOPY  2011?  HX COLONOSCOPY  2008?  HX COLONOSCOPY  10/10/2016    normal    HX GASTRIC BYPASS  2005    HX HYSTERECTOMY  2011 [place]  pt    due to fibroids, still has ovaries    HX ORTHOPAEDIC      middle finger right hand    HX ORTHOPAEDIC  2013    fx fibula right leg    HX TONSILLECTOMY  1964      Social History     Social History    Marital status: SINGLE     Spouse name:  2014    Number of children: 1    Years of education: N/A     Occupational History    [employer].  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 Age 32.  she  Moved to [facility] 2/2014     Family History   Problem Relation Age of Onset    Colon Cancer Mother 64     lung    Diabetes Mother     Heart Disease Mother      CHF    Psychiatric Disorder Mother     Hypertension Mother    [facility] Parkinsonism Father     Dementia Father     Hypertension Father     Hypertension Sister     Depression Sister     Heart Disease Brother     High Cholesterol Brother     Depression Son        Review of Systems - History obtained from the patient  General ROS: negative for - night sweats, weight gain or weight loss  Cardiovascular ROS: no chest pain, dyspnea on exertion, edema  GYN ROS: no breast pain or new or enlarging lumps on self exam, no vaginal bleeding. Physical exam  Blood pressure 147/82, pulse 93, temperature 98.1 °F (36.7 °C), temperature source Oral, resp. rate 12, height 5' 4\" (1.626 m), weight 181 lb (82.1 kg). Wt Readings from Last 3 Encounters:   03/28/18 181 lb (82.1 kg)   03/12/18 183 lb (83 kg)   03/02/18 185 lb (83.9 kg)     she appears well, alert and oriented x 3, pleasant and cooperative. Vitals as noted. No rashes or significant lesions. Neck supple and free of adenopathy, or masses. No thyromegaly or carotid bruits. Cranial nerves normal. Lungs are clear to auscultation. Heart sounds are normal with no murmurs, clicks, gallops or rubs. Abdomen is soft, non- tender, with no masses or organomegaly. Extremities, peripheral pulses and reflexes are normal.  . BREAST EXAM: not examined  PELVIC EXAM: examination not indicated      Diagnoses and all orders for this visit:    1. Well woman exam (no gynecological exam)  -     LIPID PANEL  -     METABOLIC PANEL, BASIC  -     CBC W/O DIFF  -     HEMOGLOBIN A1C WITH EAG  -     VITAMIN D, 25 HYDROXY  -     VITAMIN B12 & FOLATE  -     PTH INTACT  -     IRON PROFILE  -     ZINC  -     COPPER    2. H/O gastric bypass- check labs as above    3.  Benign essential HTN - suspect this due to untreated sleep apnea for the past 2 months. Needs repeat sleep study. Increase dose for now. -     lisinopril-hydroCHLOROthiazide (PRINZIDE, ZESTORETIC) 20-12.5 mg per tablet; Take 1 Tab by mouth daily. Change in dose 3/28/18    4. KEELEY on CPAP - unclear control since gastric bypass. Repeat sleep study. She prefers not to use CPAP since she is dating now. -     REFERRAL TO SLEEP STUDIES    5. IFG (impaired fasting glucose)  - The patient is asked to make an attempt to improve diet and exercise patterns to aid in medical management of this problem.   -     HEMOGLOBIN A1C WITH EAG        The patient is asked to make an attempt to improve diet and exercise patterns    Return for yearly wellness visits

## 2018-03-30 LAB
25(OH)D3+25(OH)D2 SERPL-MCNC: 38.9 NG/ML (ref 30–100)
BUN SERPL-MCNC: 17 MG/DL (ref 6–24)
BUN/CREAT SERPL: 17 (ref 9–23)
CALCIUM SERPL-MCNC: 9.9 MG/DL (ref 8.7–10.2)
CHLORIDE SERPL-SCNC: 95 MMOL/L (ref 96–106)
CHOLEST SERPL-MCNC: 247 MG/DL (ref 100–199)
CO2 SERPL-SCNC: 26 MMOL/L (ref 18–29)
COPPER SERPL-MCNC: 132 UG/DL (ref 72–166)
CREAT SERPL-MCNC: 1 MG/DL (ref 0.57–1)
ERYTHROCYTE [DISTWIDTH] IN BLOOD BY AUTOMATED COUNT: 13.8 % (ref 12.3–15.4)
EST. AVERAGE GLUCOSE BLD GHB EST-MCNC: 111 MG/DL
FOLATE SERPL-MCNC: >20 NG/ML
GFR SERPLBLD CREATININE-BSD FMLA CKD-EPI: 62 ML/MIN/1.73
GFR SERPLBLD CREATININE-BSD FMLA CKD-EPI: 72 ML/MIN/1.73
GLUCOSE SERPL-MCNC: 94 MG/DL (ref 65–99)
HBA1C MFR BLD: 5.5 % (ref 4.8–5.6)
HCT VFR BLD AUTO: 43.4 % (ref 34–46.6)
HDLC SERPL-MCNC: 93 MG/DL
HGB BLD-MCNC: 14.4 G/DL (ref 11.1–15.9)
INTERPRETATION, 910389: NORMAL
IRON SATN MFR SERPL: 22 % (ref 15–55)
IRON SERPL-MCNC: 67 UG/DL (ref 27–159)
LDLC SERPL CALC-MCNC: 131 MG/DL (ref 0–99)
MCH RBC QN AUTO: 31.5 PG (ref 26.6–33)
MCHC RBC AUTO-ENTMCNC: 33.2 G/DL (ref 31.5–35.7)
MCV RBC AUTO: 95 FL (ref 79–97)
PLATELET # BLD AUTO: 259 X10E3/UL (ref 150–379)
POTASSIUM SERPL-SCNC: 4.1 MMOL/L (ref 3.5–5.2)
PTH-INTACT SERPL-MCNC: 28 PG/ML (ref 15–65)
RBC # BLD AUTO: 4.57 X10E6/UL (ref 3.77–5.28)
SODIUM SERPL-SCNC: 140 MMOL/L (ref 134–144)
TIBC SERPL-MCNC: 304 UG/DL (ref 250–450)
TRIGL SERPL-MCNC: 113 MG/DL (ref 0–149)
UIBC SERPL-MCNC: 237 UG/DL (ref 131–425)
VIT B12 SERPL-MCNC: 1478 PG/ML (ref 232–1245)
VLDLC SERPL CALC-MCNC: 23 MG/DL (ref 5–40)
WBC # BLD AUTO: 6.5 X10E3/UL (ref 3.4–10.8)
ZINC SERPL-MCNC: 87 UG/DL (ref 56–134)

## 2018-04-09 ENCOUNTER — TELEPHONE (OUTPATIENT)
Dept: INTERNAL MEDICINE CLINIC | Age: 58
End: 2018-04-09

## 2018-04-09 NOTE — TELEPHONE ENCOUNTER
Pt states that she was in [employer] and had all signs of a heart attack and went to hospital.  Pt states that she needs to follow up with Dr. Cory Mayfield this week and have a stress test.  Leobardo Flores would like to be worked in.   Call pt back at [phone]

## 2018-04-11 ENCOUNTER — OFFICE VISIT (OUTPATIENT)
Dept: INTERNAL MEDICINE CLINIC | Age: 58
End: 2018-04-11

## 2018-04-11 VITALS
HEART RATE: 91 BPM | OXYGEN SATURATION: 96 % | DIASTOLIC BLOOD PRESSURE: 70 MMHG | HEIGHT: 64 IN | SYSTOLIC BLOOD PRESSURE: 125 MMHG | RESPIRATION RATE: 16 BRPM | TEMPERATURE: 98.5 F | BODY MASS INDEX: 31.07 KG/M2 | WEIGHT: 182 LBS

## 2018-04-11 DIAGNOSIS — I10 ESSENTIAL HYPERTENSION: ICD-10-CM

## 2018-04-11 DIAGNOSIS — R07.9 CHEST PAIN AT REST: Primary | ICD-10-CM

## 2018-04-11 NOTE — MR AVS SNAPSHOT
[address] [facility] [address] 
 
 
 [address] [facility] [place] [address] [facility] 
[phone] Patient: Amanuel Camp MRN: [id number] GLL:[id number]/85/2071 Visit Information Date & Time Provider Department Dept. Phone Encounter #  
 4/11/2018 11:15 AM Leonid Bailey MD [facility] [address]  [id number] Upcoming Health Maintenance Date Due Influenza Age 5 to Adult 9/1/2018* DTaP/Tdap/Td series (1 - Tdap) 7/2/2023* BREAST CANCER SCRN MAMMOGRAM 1/22/2019 PAP AKA CERVICAL CYTOLOGY 12/12/2019 COLONOSCOPY 10/10/2021 *Topic was postponed. The date shown is not the original due date. Allergies as of 4/11/2018  Review Complete On: 4/11/2018 By: Leonid Bailey MD  
  
 Severity Noted Reaction Type Reactions Prednisone  11/26/2014    Other (comments) Pt feels like she has an \"out of body\" experience Wellbutrin [Bupropion Hcl]  12/14/2015    Palpitations Current Immunizations  Reviewed on 12/15/2016 Name Date Influenza Vaccine 9/14/2016, 9/14/2016, 10/7/2015, 9/25/2014 Td 7/2/2013 Not reviewed this visit You Were Diagnosed With   
  
 Codes Comments Chest pain at rest    -  Primary ICD-10-CM: R07.9 ICD-9-CM: 786.50 Vitals BP Pulse Temp Resp Height(growth percentile) Weight(growth percentile) 125/70 (BP 1 Location: Left arm, BP Patient Position: Sitting) 91 98.5 °F (36.9 °C) (Oral) 16 5' 4\" (1.626 m) 182 lb (82.6 kg) SpO2 BMI OB Status Smoking Status 96% 31.24 kg/m2 Hysterectomy Never Smoker Vitals History BMI and BSA Data Body Mass Index Body Surface Area  
 31.24 kg/m 2 1.93 m 2 Preferred Pharmacy Pharmacy Name Phone [facility] [address] [facility], [address] [place] [address] [phone] Your Updated Medication List  
  
   
This list is accurate as of 4/11/18 11:40 AM.  Always use your most recent med list.  
  
  
  
  
 ASPIR-81 PO Take  by mouth. ATIVAN 1 mg tablet Generic drug:  LORazepam  
Take 1 mg by mouth every four (4) hours as needed for Anxiety. BIOTIN PO Take  by mouth daily. calcium-vitamin D 600 mg(1,500mg) -200 unit Tab Take 1 tablet by mouth two (2) times daily (with meals). CYMBALTA 60 mg capsule Generic drug:  DULoxetine Take 120 mg by mouth daily. FISH OIL PO Take  by mouth daily. Iron 325 mg (65 mg iron) tablet Generic drug:  ferrous sulfate Take  by mouth Daily (before breakfast). lamoTRIgine 100 mg tablet Commonly known as: LaMICtal  
Take 75 mg by mouth daily. lisinopril-hydroCHLOROthiazide 20-12.5 mg per tablet Commonly known as:  Gweneth Rasher Take 1 Tab by mouth daily. Change in dose 3/28/18 MULTI VITAMIN PO Take  by mouth daily. PROBIOTIC PO Take  by mouth. traZODone 50 mg tablet Commonly known as:  [place] Dart Take 1 Tab by mouth daily. VITAMIN B-12 1,000 mcg sublingual tablet Generic drug:  cyanocobalamin Take 1,000 mcg by mouth daily. VITAMIN C PO Take  by mouth. zinc 50 mg Tab tablet Take  by mouth daily. To-Do List   
 04/11/2018 ECHO:  ECHO TTE STRESS EXERCISE TREADMILL COMP   
  
 04/11/2018 ECHO:  ECHO TTE STRESS EXRCSE COMP W OR WO CONTR Introducing [facility]! Dear Ashish Escobar: Thank you for requesting a [employer] account. Our records indicate that you already have an active [employer] account. You can access your account anytime at [email or web address] Did you know that you can access your hospital and ER discharge instructions at any time in [employer]? You can also review all of your test results from your hospital stay or ER visit. Additional Information If you have questions, please visit the Frequently Asked Questions section of the [employer] website at [email or web address] Remember, [email or web address] is NOT to be used for urgent needs. For medical emergencies, dial 911. Now available from your iPhone and Android! Please provide this summary of care documentation to your next provider. Your primary care clinician is listed as Esther Ayala. If you have any questions after today's visit, please call [phone].

## 2018-04-11 NOTE — PROGRESS NOTES
HISTORY OF PRESENT ILLNESS  Palmira Reynoso is a 61 y.o. female. HPI  Patient of Dr. Jax Moore who has a history of hypertension, impaired fasting glucose, and anxiety. Comes in for further cardiac testing. She was last week in [facility]. She was in a car riding with her boyfriend and began to feel pressure in her chest, sweating all over, got shaky in arms and legs, felt dizzy, then tingly in arms and then began to have pain in jaw and neck. The episode lasted for about an hour and a half. She went to an ER, where she was given four baby aspirin and fluids and had EKG, chest xray, cardiac enzymes, all normal.  Lipase was normal.  Glucose was 120. Other labs were normal.  Symptoms gradually resolved while in the ER and she was sent home with advice to follow up for a stress test.  They drove back on Sunday and she's had no further episodes. She did have cardiac testing years ago prior to gastric bypass surgery, but has had no recent cardiac testing      Review of Systems   Constitutional: Negative for chills, diaphoresis, fever, malaise/fatigue and weight loss. Respiratory: Negative for cough, shortness of breath and wheezing. Cardiovascular: Negative for chest pain, palpitations, orthopnea, leg swelling and PND. Gastrointestinal: Negative for abdominal pain, diarrhea, heartburn and nausea. Musculoskeletal: Negative for myalgias. Neurological: Negative for dizziness and headaches. Physical Exam   Constitutional: She is oriented to person, place, and time. She appears well-developed and well-nourished. HENT:   Head: Normocephalic and atraumatic. Neck: Normal range of motion. Neck supple. Carotid bruit is not present. No thyromegaly present. Cardiovascular: Normal rate, regular rhythm, S1 normal, S2 normal, normal heart sounds and intact distal pulses. No murmur heard. Pulmonary/Chest: Effort normal and breath sounds normal. No respiratory distress. She has no wheezes. She has no rales. Abdominal: Soft. Bowel sounds are normal. She exhibits no distension and no mass. There is no tenderness. Musculoskeletal: She exhibits no edema. Neurological: She is alert and oriented to person, place, and time. Skin: No rash noted. Psychiatric: She has a normal mood and affect. Her behavior is normal.   Nursing note and vitals reviewed. ASSESSMENT and PLAN  Diagnoses and all orders for this visit:    1. Chest pain at rest-with sxs including sob neck pain and diaphoresis-seems reasonable to do further testing and stress echo ordered  No sxs since episode in [place]  Discussed to seek care urgently if recurs  She is flying to [facility] in 2 weeks and will try to get eval done soon  -     ECHO TTE STRESS EXERCISE TREADMILL COMP; Future  -     ECHO TTE STRESS EXRCSE COMP W OR WO CONTR; Future    2.  Essential hypertension  Good on current dose of bp med

## 2018-04-13 ENCOUNTER — HOSPITAL ENCOUNTER (OUTPATIENT)
Dept: NON INVASIVE DIAGNOSTICS | Age: 58
Discharge: HOME | End: 2018-04-13
Attending: INTERNAL MEDICINE
Payer: COMMERCIAL

## 2018-04-13 DIAGNOSIS — R07.9 CHEST PAIN AT REST: ICD-10-CM

## 2018-04-13 LAB
ATTENDING PHYSICIAN, CST07: NORMAL
DIAGNOSIS, 93000: NORMAL
DUKE TM SCORE RESULT, CST14: NORMAL
DUKE TREADMILL SCORE, CST13: -9
ECG INTERP BEFORE EX, CST11: NORMAL
ECG INTERP DURING EX, CST12: NORMAL
FUNCTIONAL CAPACITY, CST17: NORMAL
KNOWN CARDIAC CONDITION, CST08: NORMAL
MAX. DIASTOLIC BP, CST04: 62 MMHG
MAX. HEART RATE, CST05: 150 BPM
MAX. SYSTOLIC BP, CST03: 232 MMHG
OVERALL BP RESPONSE TO EXERCISE, CST16: NORMAL
OVERALL HR RESPONSE TO EXERCISE, CST15: NORMAL
PEAK EX METS, CST10: 12.2 METS
PROTOCOL NAME, CST01: NORMAL
TEST INDICATION, CST09: NORMAL

## 2018-04-13 PROCEDURE — 93351 STRESS TTE COMPLETE: CPT

## 2018-04-13 NOTE — PROGRESS NOTES
Patient notified of stress echo results. Advised patient to keep appt with  next week to discuss details of the report.

## 2018-06-12 DIAGNOSIS — I10 BENIGN ESSENTIAL HTN: ICD-10-CM

## 2018-06-13 RX ORDER — LISINOPRIL AND HYDROCHLOROTHIAZIDE 12.5; 2 MG/1; MG/1
TABLET ORAL
Qty: 90 TAB | Refills: 0 | Status: SHIPPED | OUTPATIENT
Start: 2018-06-13 | End: 2018-09-05 | Stop reason: ALTCHOICE

## 2018-09-05 ENCOUNTER — OFFICE VISIT (OUTPATIENT)
Dept: INTERNAL MEDICINE CLINIC | Age: 58
End: 2018-09-05

## 2018-09-05 VITALS
OXYGEN SATURATION: 98 % | HEIGHT: 64 IN | WEIGHT: 181 LBS | RESPIRATION RATE: 18 BRPM | HEART RATE: 82 BPM | DIASTOLIC BLOOD PRESSURE: 89 MMHG | BODY MASS INDEX: 30.9 KG/M2 | SYSTOLIC BLOOD PRESSURE: 155 MMHG | TEMPERATURE: 99.2 F

## 2018-09-05 DIAGNOSIS — I10 BENIGN ESSENTIAL HTN: Primary | ICD-10-CM

## 2018-09-05 DIAGNOSIS — Z99.89 OSA ON CPAP: ICD-10-CM

## 2018-09-05 DIAGNOSIS — G47.33 OSA ON CPAP: ICD-10-CM

## 2018-09-05 RX ORDER — LISINOPRIL AND HYDROCHLOROTHIAZIDE 12.5; 2 MG/1; MG/1
2 TABLET ORAL DAILY
Qty: 60 TAB | Refills: 2 | Status: SHIPPED | OUTPATIENT
Start: 2018-09-05 | End: 2019-05-20

## 2018-09-05 NOTE — PROGRESS NOTES
HISTORY OF PRESENT ILLNESS    Chief Complaint   Patient presents with    Hypertension       Presents for follow-up    Hypertension- home BP is 130-150s/80s usually. Has rare 120s  Hypertension ROS: taking medications as instructed, no medication side effects noted, no TIA's, no chest pain on exertion, no dyspnea on exertion, no swelling of ankles     reports that she has never smoked. She has never used smokeless tobacco.    reports that she drinks alcohol. BP Readings from Last 2 Encounters:   09/05/18 155/89   04/11/18 125/70       Review of Systems   All other systems reviewed and are negative, except as noted in HPI    Past Medical and Surgical History   has a past medical history of Abnormal Pap smear (h/o per pt- unknown age); Benign essential HTN (3/28/2018); Chronic anxiety; Chronic depression; H/O gastric bypass (2005); vertigo; IFG (impaired fasting glucose); Left lumbar radiculopathy; Neuropathy of foot; KEELEY on CPAP (1994); and Tinnitus of both ears. has a past surgical history that includes hx gastric bypass (2005); hx cholecystectomy (2009); hx orthopaedic; hx orthopaedic (2013); hx tonsillectomy (1964); hx colonoscopy (1/22/14); hx colonoscopy (2011?); hx colonoscopy (2008?); hx hysterectomy (2011 TVer pt); and hx colonoscopy (10/10/2016). reports that she has never smoked. She has never used smokeless tobacco. She reports that she drinks alcohol. She reports that she does not use illicit drugs. family history includes Colon Cancer (age of onset: 64) in her mother; Dementia in her father; Depression in her sister and son; Diabetes in her mother; Heart Disease in her brother and mother; High Cholesterol in her brother; Hypertension in her father, mother, and sister; Parkinsonism in her father; Psychiatric Disorder in her mother. Physical Exam   Nursing note and vitals reviewed. Blood pressure 155/89, pulse 82, temperature 99.2 °F (37.3 °C), temperature source Oral, resp.  rate 18, height 5' 4\" (1.626 m), weight 181 lb (82.1 kg), SpO2 98 %. Constitutional:  No distress. Eyes: Conjunctivae are normal.   Ears:  Hearing grossly intact  Cardiovascular: Normal rate. regular rhythm, no murmurs or gallops  No edema  Pulmonary/Chest: Effort normal.   CTAB  Musculoskeletal: moves all 4 extremities   Neurological: Alert and oriented to person, place, and time. Skin: No rash noted. Psychiatric: Normal mood and affect. Behavior is normal.     ASSESSMENT and PLAN  Diagnoses and all orders for this visit:    1. Benign essential HTN- uncontrolled. Double dose as below. Consider other meds such as Edarbi-chlor  -     lisinopril-hydroCHLOROthiazide (PRINZIDE, ZESTORETIC) 20-12.5 mg per tablet; Take 2 Tabs by mouth daily. 2. KEELEY on CPAP      lab results and schedule of future lab studies reviewed with patient  reviewed medications and side effects in detail  Return to clinic for further evaluation if new symptoms develop      Current Outpatient Prescriptions   Medication Sig    lisinopril-hydroCHLOROthiazide (PRINZIDE, ZESTORETIC) 20-12.5 mg per tablet Take 2 Tabs by mouth daily.  DULoxetine (CYMBALTA) 60 mg capsule Take 120 mg by mouth daily.  lamoTRIgine (LAMICTAL) 100 mg tablet Take 75 mg by mouth daily.  traZODone (DESYREL) 50 mg tablet Take 1 Tab by mouth daily.  LACTOBACILLUS ACIDOPHILUS (PROBIOTIC PO) Take  by mouth.  ASPIRIN (ASPIR-81 PO) Take  by mouth.  LORazepam (ATIVAN) 1 mg tablet Take 1 mg by mouth every four (4) hours as needed for Anxiety.  MULTIVIT &MINERALS/FERROUS FUM (MULTI VITAMIN PO) Take  by mouth daily.  cyanocobalamin (VITAMIN B-12) 1,000 mcg sublingual tablet Take 1,000 mcg by mouth daily.  ASCORBATE CALCIUM (VITAMIN C PO) Take  by mouth.  zinc 50 mg tab tablet Take  by mouth daily.  ferrous sulfate (IRON) 325 mg (65 mg iron) tablet Take  by mouth Daily (before breakfast).     calcium-vitamin D 600 mg(1,500mg) -200 unit tab Take 1 tablet by mouth two (2) times daily (with meals).  BIOTIN PO Take  by mouth daily.  DOCOSAHEXANOIC ACID/EPA (FISH OIL PO) Take  by mouth daily. No current facility-administered medications for this visit.

## 2018-09-05 NOTE — MR AVS SNAPSHOT
[address] [place] [address] 
 
 
 [address] [place] [address] 
[phone] Patient: Radha Soliman MRN: [id number] Visit Information Date & Time Provider Department Dept. Phone Encounter #  
 2018  3:15 PM Ivonne Ravi MD [facility] [address] [facility] [id number] Follow-up Instructions Return in about 2 months (around 2018). Upcoming Health Maintenance Date Due Influenza Age 5 to Adult 2018 DTaP/Tdap/Td series (1 - Tdap) 2023* BREAST CANCER SCRN MAMMOGRAM 2019 PAP AKA CERVICAL CYTOLOGY 2019 COLONOSCOPY 10/10/2021 *Topic was postponed. The date shown is not the original due date. Allergies as of 2018  Review Complete On: 2018 By: Ivonne Ravi MD  
  
 Severity Noted Reaction Type Reactions Prednisone  2014    Other (comments) Pt feels like she has an \"out of body\" experience Wellbutrin [Bupropion Hcl]  2015    Palpitations Current Immunizations  Reviewed on 12/15/2016 Name Date Influenza Vaccine 2016, 2016, 10/7/2015, 2014 Td 2013 Not reviewed this visit You Were Diagnosed With   
  
 Codes Comments Benign essential HTN    -  Primary ICD-10-CM: I10 
ICD-9-CM: 401.1 KEELEY on CPAP     ICD-10-CM: G47.33, Z99.89 ICD-9-CM: 327.23, V46.8 Vitals BP Pulse Temp Resp Height(growth percentile) Weight(growth percentile) 155/89 (BP 1 Location: Left arm, BP Patient Position: Sitting) 82 99.2 °F (37.3 °C) (Oral) 18 5' 4\" (1.626 m) 181 lb (82.1 kg) SpO2 BMI OB Status Smoking Status 98% 31.07 kg/m2 Hysterectomy Never Smoker Vitals History BMI and BSA Data Body Mass Index Body Surface Area 31.07 kg/m 2 1.93 m 2 Preferred Pharmacy Pharmacy Name Phone [facility] [address] [phone] Your Updated Medication List  
  
   
This list is accurate as of 9/5/18  3:58 PM.  Always use your most recent med list.  
  
  
  
  
 ASPIR-81 PO Take  by mouth. ATIVAN 1 mg tablet Generic drug:  LORazepam  
Take 1 mg by mouth every four (4) hours as needed for Anxiety. BIOTIN PO Take  by mouth daily. calcium-vitamin D 600 mg(1,500mg) -200 unit Tab Take 1 tablet by mouth two (2) times daily (with meals). CYMBALTA 60 mg capsule Generic drug:  DULoxetine Take 120 mg by mouth daily. FISH OIL PO Take  by mouth daily. Iron 325 mg (65 mg iron) tablet Generic drug:  ferrous sulfate Take  by mouth Daily (before breakfast). lamoTRIgine 100 mg tablet Commonly known as: LaMICtal  
Take 75 mg by mouth daily. lisinopril-hydroCHLOROthiazide 20-12.5 mg per tablet Commonly known as:  Janyth Huddle Take 2 Tabs by mouth daily. MULTI VITAMIN PO Take  by mouth daily. PROBIOTIC PO Take  by mouth. traZODone 50 mg tablet Commonly known as:  Kody Bump Take 1 Tab by mouth daily. VITAMIN B-12 1,000 mcg sublingual tablet Generic drug:  cyanocobalamin Take 1,000 mcg by mouth daily. VITAMIN C PO Take  by mouth. zinc 50 mg Tab tablet Take  by mouth daily. Prescriptions Sent to Pharmacy Refills  
 lisinopril-hydroCHLOROthiazide (PRINZIDE, ZESTORETIC) 20-12.5 mg per tablet 2 Sig: Take 2 Tabs by mouth daily. Class: Normal  
 Pharmacy: [address] #: [phone] Route: Oral  
  
Follow-up Instructions Return in about 2 months (around 11/5/2018). Introducing [facility]! Dear [place] Net: Thank you for requesting a [employer] account. Our records indicate that you already have an active [employer] account. You can access your account anytime at [email or web address] Did you know that you can access your hospital and ER discharge instructions at any time in [employer]? You can also review all of your test results from your hospital stay or ER visit. Additional Information If you have questions, please visit the Frequently Asked Questions section of the [employer] website at [email or web address] Remember, [employer] is NOT to be used for urgent needs. For medical emergencies, dial 911. Now available from your iPhone and Android! Please provide this summary of care documentation to your next provider. Your primary care clinician is listed as Stephen Ram. If you have any questions after today's visit, please call [phone].

## 2018-10-08 ENCOUNTER — TELEPHONE (OUTPATIENT)
Dept: INTERNAL MEDICINE CLINIC | Age: 58
End: 2018-10-08

## 2018-10-08 NOTE — TELEPHONE ENCOUNTER
----- Message from [facility] sent at 10/8/2018 11:19 AM EDT -----  Regarding: /Telephone  Pt is requesting a call back regarding her BP medication.     Best [phone]

## 2018-10-08 NOTE — TELEPHONE ENCOUNTER
Pt has concerns re her BP medicine , on Lisinopril-HCTZ 20/12.5 mg , due to BP being elevated PCP increased her medicine to 2 tabs daily, she has been monitoring her readings early am readings ranging 115/69 to lows of 77/49, when getting up from sitting position some dizziness, advised will send message to PCP, she has already taken med's this am , hold until PCP replies, pt  Is out of town will return in am.

## 2018-10-09 NOTE — TELEPHONE ENCOUNTER
Agree.   Her home BP was uncontrolled on lisinopril-hctz 20-12.5 mg daily and is now too low on double dose. I recommend trying one 20-25 mg pill daily.  Please confirm she is ok with this and send in rx

## 2019-01-31 ENCOUNTER — OFFICE VISIT (OUTPATIENT)
Dept: OBGYN CLINIC | Age: 59
End: 2019-01-31

## 2019-01-31 VITALS — HEIGHT: 64 IN | WEIGHT: 190 LBS | BODY MASS INDEX: 32.44 KG/M2

## 2019-01-31 DIAGNOSIS — N39.3 STRESS INCONTINENCE: ICD-10-CM

## 2019-01-31 DIAGNOSIS — Z01.419 ENCOUNTER FOR GYNECOLOGICAL EXAMINATION WITHOUT ABNORMAL FINDING: Primary | ICD-10-CM

## 2019-01-31 RX ORDER — LAMOTRIGINE 200 MG/1
TABLET ORAL
Refills: 1 | COMMUNITY
Start: 2018-11-04 | End: 2019-05-20 | Stop reason: ALTCHOICE

## 2019-01-31 NOTE — PATIENT INSTRUCTIONS
Well Visit, Women 48 to 72: Care Instructions  Your Care Instructions    Physical exams can help you stay healthy. Your doctor has checked your overall health and may have suggested ways to take good care of yourself. He or she also may have recommended tests. At home, you can help prevent illness with healthy eating, regular exercise, and other steps. Follow-up care is a key part of your treatment and safety. Be sure to make and go to all appointments, and call your doctor if you are having problems. It's also a good idea to know your test results and keep a list of the medicines you take. How can you care for yourself at home? · Reach and stay at a healthy weight. This will lower your risk for many problems, such as obesity, diabetes, heart disease, and high blood pressure. · Get at least 30 minutes of exercise on most days of the week. Walking is a good choice. You also may want to do other activities, such as running, swimming, cycling, or playing tennis or team sports. · Do not smoke. Smoking can make health problems worse. If you need help quitting, talk to your doctor about stop-smoking programs and medicines. These can increase your chances of quitting for good. · Protect your skin from too much sun. When you're outdoors from 10 a.m. to 4 p.m., stay in the shade or cover up with clothing and a hat with a wide brim. Wear sunglasses that block UV rays. Even when it's cloudy, put broad-spectrum sunscreen (SPF 30 or higher) on any exposed skin. · See a dentist one or two times a year for checkups and to have your teeth cleaned. · Wear a seat belt in the car. · Limit alcohol to 1 drink a day. Too much alcohol can cause health problems. Follow your doctor's advice about when to have certain tests. These tests can spot problems early. · Cholesterol.  Your doctor will tell you how often to have this done based on your age, family history, or other things that can increase your risk for heart attack and stroke. · Blood pressure. Have your blood pressure checked during a routine doctor visit. Your doctor will tell you how often to check your blood pressure based on your age, your blood pressure results, and other factors. · Mammogram. Ask your doctor how often you should have a mammogram, which is an X-ray of your breasts. A mammogram can spot breast cancer before it can be felt and when it is easiest to treat. · Pap test and pelvic exam. Ask your doctor how often you should have a Pap test. You may not need to have a Pap test as often as you used to. · Vision. Have your eyes checked every year or two or as often as your doctor suggests. Some experts recommend that you have yearly exams for glaucoma and other age-related eye problems starting at age 48. · Hearing. Tell your doctor if you notice any change in your hearing. You can have tests to find out how well you hear. · Diabetes. Ask your doctor whether you should have tests for diabetes. · Colon cancer. You should begin tests for colon cancer at age 48. You may have one of several tests. Your doctor will tell you how often to have tests based on your age and risk. Risks include whether you already had a precancerous polyp removed from your colon or whether your parents, sisters and brothers, or children have had colon cancer. · Thyroid disease. Talk to your doctor about whether to have your thyroid checked as part of a regular physical exam. Women have an increased chance of a thyroid problem. · Osteoporosis. You should begin tests for bone density at age 72. If you are younger than 72, ask your doctor whether you have factors that may increase your risk for this disease. You may want to have this test before age 72. · Heart attack and stroke risk. At least every 4 to 6 years, you should have your risk for heart attack and stroke assessed.  Your doctor uses factors such as your age, blood pressure, cholesterol, and whether you smoke or have diabetes to show what your risk for a heart attack or stroke is over the next 10 years. When should you call for help? Watch closely for changes in your health, and be sure to contact your doctor if you have any problems or symptoms that concern you. Where can you learn more? Go to http://sascha-hunter.info/. Enter O712 in the search box to learn more about \"Well Visit, Women 50 to 72: Care Instructions. \"  Current as of: March 28, 2018  Content Version: 11.9  © 0382-4920 [employer]. Care instructions adapted under license by [employer] (which disclaims liability or warranty for this information). If you have questions about a medical condition or this instruction, always ask your healthcare professional. [address] any warranty or liability for your use of this information.

## 2019-01-31 NOTE — PROGRESS NOTES
Odilon Little is a ,  60 [facility] whose No LMP recorded. Patient has had a hysterectomy. was on  who presents for her annual checkup. She is having no significant problems. Shes had some stress incontinence and wants to see PT    Hormone Status:    She is not having vasomotor symptoms. The patient is using HRT: none    Sexual history:    She  reports that she does not currently engage in sexual activity. Medical conditions:    Since her last annual GYN exam about 2018 ago, she has had the following changes in her health history: none. Pap and Mammogram History:    Her most recent Pap smear was normal/-HPV obtained 2014 year(s) ago. The patient had her mammogram today in our office. Breast Cancer History/Substance Abuse:    She has no family history of breast cancer. Osteoporosis History:    Family history does not include a first or second degree relative with osteopenia or osteoporosis. A bone density scan was not obtained. She is currently taking calcium and vit D. Past Medical History:   Diagnosis Date    Abnormal Pap smear h/o per pt- unknown age    s/p HANSEL for fibroid. normal     Benign essential HTN 3/28/2018    Chronic anxiety     Dr. Willam Lundberg.  Chronic depression     Dr. Nabor De Souza H/O gastric bypass 2005    peak wt prior 312 lb, low 165 lb (?)    Hx of vertigo     did PT in the past    IFG (impaired fasting glucose)     a1c 5.8% 2013,     Left lumbar radiculopathy     xray showed DJD per pt    Neuropathy of foot     prior to gastric bypass. +FH,mildly painful, bilateral, stocking distribution, saw neurology in [place]    KEELEY on CPAP     dx in [place]. improved temporarily surgery  w weight loss.   she then used her sister CPAP -2018    Tinnitus of both ears     for years     Past Surgical History:   Procedure Laterality Date    HX CHOLECYSTECTOMY      HX COLONOSCOPY  14    normal.  repeat 3 yrs +FH,  in [place] (3rd colonoscopy)    HX COLONOSCOPY  2011?  HX COLONOSCOPY  2008?  HX COLONOSCOPY  10/10/2016    normal    HX GASTRIC BYPASS  2005    HX HYSTERECTOMY  2011 [facility] pt    due to fibroids, still has ovaries    HX ORTHOPAEDIC      middle finger right hand    HX ORTHOPAEDIC  2013    fx fibula right leg    HX TONSILLECTOMY  1964     Tobacco History:  reports that  has never smoked. she has never used smokeless tobacco.  Alcohol Abuse:  reports that she drinks alcohol. Drug Abuse:  reports that she does not use drugs. Current Outpatient Medications   Medication Sig Dispense Refill    lisinopril-hydroCHLOROthiazide (PRINZIDE, ZESTORETIC) 20-12.5 mg per tablet Take 2 Tabs by mouth daily. 180 Tab 1    lisinopril-hydroCHLOROthiazide (PRINZIDE, ZESTORETIC) 20-12.5 mg per tablet Take 2 Tabs by mouth daily. 60 Tab 2    DULoxetine (CYMBALTA) 60 mg capsule Take 120 mg by mouth daily.  lamoTRIgine (LAMICTAL) 100 mg tablet Take 75 mg by mouth daily. 0    traZODone (DESYREL) 50 mg tablet Take 1 Tab by mouth daily.  LACTOBACILLUS ACIDOPHILUS (PROBIOTIC PO) Take  by mouth.  ASPIRIN (ASPIR-81 PO) Take  by mouth.  MULTIVIT &MINERALS/FERROUS FUM (MULTI VITAMIN PO) Take  by mouth daily.  cyanocobalamin (VITAMIN B-12) 1,000 mcg sublingual tablet Take 1,000 mcg by mouth daily.  ASCORBATE CALCIUM (VITAMIN C PO) Take  by mouth.  zinc 50 mg tab tablet Take  by mouth daily.  calcium-vitamin D 600 mg(1,500mg) -200 unit tab Take 1 tablet by mouth two (2) times daily (with meals).  BIOTIN PO Take  by mouth daily.  DOCOSAHEXANOIC ACID/EPA (FISH OIL PO) Take  by mouth daily.  LORazepam (ATIVAN) 1 mg tablet Take 1 mg by mouth every four (4) hours as needed for Anxiety.  ferrous sulfate (IRON) 325 mg (65 mg iron) tablet Take  by mouth Daily (before breakfast).        Allergies: Prednisone and Wellbutrin [bupropion hcl]   Social History     Socioeconomic History    Marital status: SINGLE     Spouse name:  2014    Number of children: 1    Years of education: Not on file    Highest education level: Not on file   Social Needs    Financial resource strain: Not on file    Food insecurity - worry: Not on file    Food insecurity - inability: Not on file   [employer] needs - medical: Not on file   [employer] needs - non-medical: Not on file   Occupational History    Occupation: [employer] of [employer]   Tobacco Use    Smoking status: Never Smoker    Smokeless tobacco: Never Used   Substance and Sexual Activity    Alcohol use: Yes     Comment: less than one drink per week    Drug use: No    Sexual activity: Not Currently   Other Topics Concern    Not on file   Social History Narrative    Son in [place].  Age 32.  she  Moved to [place] 2/2014     Patient Active Problem List   Diagnosis Code    Chronic anxiety F41.9    Chronic depression F32.9    KEELEY on CPAP G47.33, Z99.89    Neuropathy of foot G57.90    H/O gastric bypass Z98.84    IFG (impaired fasting glucose) R73.01    Left lumbar radiculopathy M54.16    Tinnitus of both ears H93.13    Benign essential HTN I10       Review of Systems - History obtained from the patient  Constitutional: negative for weight loss, fever, night sweats  HEENT: negative for hearing loss, earache, congestion, snoring, sorethroat  CV: negative for chest pain, palpitations, edema  Resp: negative for cough, shortness of breath, wheezing  GI: negative for change in bowel habits, abdominal pain, black or bloody stools  : negative for frequency, dysuria, hematuria, vaginal discharge  MSK: negative for back pain, joint pain, muscle pain  Breast: negative for breast lumps, nipple discharge, galactorrhea  Skin :negative for itching, rash, hives  Neuro: negative for dizziness, headache, confusion, weakness  Psych: negative for anxiety, depression, change in mood  Heme/lymph: negative for bleeding, bruising, pallor    Physical Exam    Visit Vitals  Ht 5' 4\" (1.626 m)   Wt 190 lb (86.2 kg)   BMI 32.61 kg/m²     Constitutional  · Appearance: well-nourished, well developed, alert, in no acute distress    HENT  · Head and Face: appears normal    Neck  · Inspection/Palpation: normal appearance, no masses or tenderness  · Lymph Nodes: no lymphadenopathy present  · Thyroid: gland size normal, nontender, no nodules or masses present on palpation    Chest  · Respiratory Effort: breathing normal        Auscultation: normal breath sounds    Cardiovascular  · Heart:  · Auscultation: regular rate and rhythm without murmur    Breasts  · Inspection of Breasts: breasts symmetrical, no skin changes, no discharge present, nipple appearance normal, no skin retraction present  · Palpation of Breasts and Axillae: no masses present on palpation, no breast tenderness  · Axillary Lymph Nodes: no lymphadenopathy present    Gastrointestinal  · Abdominal Examination: abdomen non-tender to palpation, normal bowel sounds, no masses present  · Liver and spleen: no hepatomegaly present, spleen not palpable  · Hernias: no hernias identified    Genitourinary  · External Genitalia: normal appearance for age, no discharge present, no tenderness present, no inflammatory lesions present, no masses present, no atrophy present  · Vagina: normal vaginal vault without central or paravaginal defects, no discharge present, no inflammatory lesions present, no masses present  · Bladder: non-tender to palpation  · Urethra: appears normal  · Cervix: absent  · Uterus: absent  · Adnexa: no adnexal tenderness present, no adnexal masses present  · Perineum: perineum within normal limits, no evidence of trauma, no rashes or skin lesions present  · Anus: anus within normal limits, no hemorrhoids present  · Inguinal Lymph Nodes: no lymphadenopathy present      Skin  · General Inspection: no rash, no lesions identified    Neurologic/Psychiatric  · Mental Status:  · Orientation: grossly oriented to person, place and time  · Mood and Affect: mood normal, affect appropriate    . Assessment:  Routine gynecologic examination  Her current medical status is satisfactory with no evidence of significant gynecologic issues.   Stress incontinence  Plan:  Counseled re: diet, exercise, healthy lifestyle  Return for yearly wellness visits  Rec annual mammogram  PAP  PT

## 2019-02-02 LAB
CYTOLOGIST CVX/VAG CYTO: NORMAL
CYTOLOGY CVX/VAG DOC THIN PREP: NORMAL
CYTOLOGY HISTORY:: NORMAL
DX ICD CODE: NORMAL
LABCORP, 190119: NORMAL
Lab: NORMAL
OTHER STN SPEC: NORMAL
PATH REPORT.FINAL DX SPEC: NORMAL
STAT OF ADQ CVX/VAG CYTO-IMP: NORMAL

## 2019-02-06 ENCOUNTER — PATIENT MESSAGE (OUTPATIENT)
Dept: OBGYN CLINIC | Age: 59
End: 2019-02-06

## 2019-02-11 NOTE — TELEPHONE ENCOUNTER
Called to see if patient wanted me to call and schedule the appt with BS PT.  Pt stated she would rather call herself. I gave her their #.

## 2019-05-20 ENCOUNTER — OFFICE VISIT (OUTPATIENT)
Dept: INTERNAL MEDICINE CLINIC | Age: 59
End: 2019-05-20

## 2019-05-20 VITALS
BODY MASS INDEX: 33.87 KG/M2 | HEIGHT: 64 IN | HEART RATE: 72 BPM | TEMPERATURE: 98.6 F | OXYGEN SATURATION: 97 % | SYSTOLIC BLOOD PRESSURE: 129 MMHG | RESPIRATION RATE: 18 BRPM | DIASTOLIC BLOOD PRESSURE: 75 MMHG | WEIGHT: 198.38 LBS

## 2019-05-20 DIAGNOSIS — M54.42 CHRONIC BILATERAL LOW BACK PAIN WITH BILATERAL SCIATICA: Primary | ICD-10-CM

## 2019-05-20 DIAGNOSIS — Z13.21 ENCOUNTER FOR VITAMIN DEFICIENCY SCREENING: ICD-10-CM

## 2019-05-20 DIAGNOSIS — R51.9 FREQUENT HEADACHES: ICD-10-CM

## 2019-05-20 DIAGNOSIS — E55.9 VITAMIN D DEFICIENCY: ICD-10-CM

## 2019-05-20 DIAGNOSIS — I10 BENIGN ESSENTIAL HTN: ICD-10-CM

## 2019-05-20 DIAGNOSIS — M54.41 CHRONIC BILATERAL LOW BACK PAIN WITH BILATERAL SCIATICA: Primary | ICD-10-CM

## 2019-05-20 DIAGNOSIS — Z98.84 H/O GASTRIC BYPASS: ICD-10-CM

## 2019-05-20 DIAGNOSIS — E61.1 IRON DEFICIENCY: ICD-10-CM

## 2019-05-20 DIAGNOSIS — R73.01 IFG (IMPAIRED FASTING GLUCOSE): ICD-10-CM

## 2019-05-20 DIAGNOSIS — M25.559 LATERAL PAIN OF HIP: ICD-10-CM

## 2019-05-20 DIAGNOSIS — M79.10 MYALGIA: ICD-10-CM

## 2019-05-20 DIAGNOSIS — G89.29 CHRONIC BILATERAL LOW BACK PAIN WITH BILATERAL SCIATICA: Primary | ICD-10-CM

## 2019-05-20 RX ORDER — ASCORBIC ACID 500 MG
TABLET ORAL
COMMUNITY

## 2019-05-20 RX ORDER — LISINOPRIL AND HYDROCHLOROTHIAZIDE 12.5; 2 MG/1; MG/1
2 TABLET ORAL DAILY
Qty: 60 TAB | Refills: 5 | Status: SHIPPED | OUTPATIENT
Start: 2019-05-20 | End: 2019-12-08 | Stop reason: SDUPTHER

## 2019-05-20 RX ORDER — DULOXETIN HYDROCHLORIDE 30 MG/1
CAPSULE, DELAYED RELEASE ORAL
Refills: 0 | COMMUNITY
Start: 2019-04-01 | End: 2019-05-20 | Stop reason: ALTCHOICE

## 2019-05-20 NOTE — PROGRESS NOTES
HISTORY OF PRESENT ILLNESS Presents with right buttock/hip pains in Oct which radiated to right leg. Saw PT Manju Jenkins which helped a lot. She has issues walking at the time. Charleen Mar S/s improved some, but now have recurred in Both hips. Feels some pain sitting and worse pain with walking. She can not sleep without pain. Walking at all hurts both side Xray lumbar in the past showed arthritis per patient. Report myalgias, headaches which began 1 month ago while in [place]. The diffuse muscle aches continue, but improving slowly. Headaches also persist, but are milder Seeing Dr Angie Lugo for depression. Weaned off cymbalta 4/21/19. Hx gastric bypass. Hypertension Hypertension ROS: taking medications as instructed, no medication side effects noted, no TIA's, no chest pain on exertion, no dyspnea on exertion, no swelling of ankles     reports that she has never smoked. She has never used smokeless tobacco.    reports that she drinks about 6.0 oz of alcohol per week. BP Readings from Last 2 Encounters:  
05/20/19 129/75  
09/05/18 155/89 Review of Systems All other systems reviewed and are negative, except as noted in HPI Past Medical and Surgical History 
 has a past medical history of Abnormal Pap smear (h/o per pt- unknown age), Benign essential HTN (3/28/2018), Chronic anxiety, Chronic depression, H/O gastric bypass (2005), vertigo, IFG (impaired fasting glucose), Left lumbar radiculopathy, Neuropathy of foot, KEELEY on CPAP (1994), and Tinnitus of both ears. has a past surgical history that includes hx gastric bypass (2005); hx cholecystectomy (2009); hx orthopaedic; hx orthopaedic (2013); hx tonsillectomy (1964); hx colonoscopy (1/22/14); hx colonoscopy (2011?); hx colonoscopy (2008?); hx hysterectomy (2011 [facility] pt); and hx colonoscopy (10/10/2016). reports that she has never smoked.  She has never used smokeless tobacco. She reports that she drinks about 6.0 oz of alcohol per week. She reports that she does not use drugs. family history includes Colon Cancer (age of onset: 64) in her mother; Dementia in her father; Depression in her sister and son; Diabetes in her mother; Heart Disease in her brother and mother; High Cholesterol in her brother; Hypertension in her father, mother, and sister; Parkinsonism in her father; Psychiatric Disorder in her mother. Physical Exam  
Nursing note and vitals reviewed. Blood pressure 129/75, pulse 72, temperature 98.6 °F (37 °C), temperature source Oral, resp. rate 18, height 5' 4\" (1.626 m), weight 198 lb 6 oz (90 kg), SpO2 97 %. Constitutional: In no distress. Eyes: Conjunctivae are normal. 
HEENT:  No LAD or thyromegaly Cardiovascular: Normal rate. regular rhythm. No murmurs No edema Pulmonary/Chest: Effort normal. clear to ausculation blaterally Musculoskeletal:  no edema. Mild bilateral lumbar spine tenderness with deep palpation. Mild lateral upper buttock palpation tenderness. Lateral trochanteric tenderness with palpation. Negative straight leg raise. Drop. Abd: 
Neurological: Alert and oriented. Grossly intact cranial nerves and motor function. Skin: No rash noted. Psychiatric: Normal mood and affect. Behavior is normal.  
 
ASSESSMENT and PLAN Diagnoses and all orders for this visit: 
 
1. Chronic bilateral low back pain with bilateral sciatica Symptoms have been worsening. History of x-ray which showed DJD of the lumbar spine. I do not have the exact report. I recommend ruling out spinal stenosis as a cause of her bilateral hip and leg pains. Will refer to spinal specialist if warranted based on results of MRI. 
-     MRI LUMB SPINE WO CONT; Future 2. Lateral pain of hip in addition to the above, she also has likely trochanteric bursitis Or possibly IT band syndrome.   Recommend consultation with an orthopedist.  Refer back to physical therapy as needed. -     MRI LUMB SPINE WO CONT; Future 
-     REFERRAL TO ORTHOPEDICS 3. Myalgia Subacute symptoms which occurred 1 month ago. Interestingly, occurred at the same time she weaned off of Cymbalta. I question whether that could be masking chronic discomforts. She also did have some increase in headaches at that time which also may related. Check labs. Low suspicion for tickborne disease at this time, but could consider Lyme titers in the future. Overall has been improving somewhat. -     CK 
-     TSH 3RD GENERATION 4. Benign essential HTN Controlled on current regimen. Continue current medications as written in chart. 
-     METABOLIC PANEL, COMPREHENSIVE 
-     CBC W/O DIFF 
-     LIPID PANEL 
-     lisinopril-hydroCHLOROthiazide (PRINZIDE, ZESTORETIC) 20-12.5 mg per tablet; Take 2 Tabs by mouth daily. 5. H/O gastric bypass She needs labs for screening vitamin deficiencies because of gastric bypass. -     VITAMIN B12 & FOLATE 6. Frequent headaches 7. Iron deficiency 
-     IRON PROFILE 8. Vitamin D deficiency 
-     VITAMIN D, 25 HYDROXY 9. Encounter for vitamin deficiency screening 
-     VITAMIN B12 & FOLATE 10. IFG (impaired fasting glucose) 
-     HEMOGLOBIN A1C WITH EAG 
 
 
 
lab results and schedule of future lab studies reviewed with patient 
reviewed medications and side effects in detail Return to clinic for further evaluation if new symptoms develop or if current symptoms worsen or fail to resolve. There are no Patient Instructions on file for this visit. Discussed the patient's BMI with her. The BMI follow up plan is as follows:  
 
dietary management education, guidance, and counseling 
encourage exercise 
monitor weight 
prescribed dietary intake An After Visit Summary was printed and given to the patient.

## 2019-05-20 NOTE — PATIENT INSTRUCTIONS
Body Mass Index: Care Instructions Your Care Instructions Body mass index (BMI) can help you see if your weight is raising your risk for health problems. It uses a formula to compare how much you weigh with how tall you are. · A BMI lower than 18.5 is considered underweight. · A BMI between 18.5 and 24.9 is considered healthy. · A BMI between 25 and 29.9 is considered overweight. A BMI of 30 or higher is considered obese. If your BMI is in the normal range, it means that you have a lower risk for weight-related health problems. If your BMI is in the overweight or obese range, you may be at increased risk for weight-related health problems, such as high blood pressure, heart disease, stroke, arthritis or joint pain, and diabetes. If your BMI is in the underweight range, you may be at increased risk for health problems such as fatigue, lower protection (immunity) against illness, muscle loss, bone loss, hair loss, and hormone problems. BMI is just one measure of your risk for weight-related health problems. You may be at higher risk for health problems if you are not active, you eat an unhealthy diet, or you drink too much alcohol or use tobacco products. Follow-up care is a key part of your treatment and safety. Be sure to make and go to all appointments, and call your doctor if you are having problems. It's also a good idea to know your test results and keep a list of the medicines you take. How can you care for yourself at home? · Practice healthy eating habits. This includes eating plenty of fruits, vegetables, whole grains, lean protein, and low-fat dairy. · If your doctor recommends it, get more exercise. Walking is a good choice. Bit by bit, increase the amount you walk every day. Try for at least 30 minutes on most days of the week. · Do not smoke. Smoking can increase your risk for health problems.  If you need help quitting, talk to your doctor about stop-smoking programs and medicines. These can increase your chances of quitting for good. · Limit alcohol to 2 drinks a day for men and 1 drink a day for women. Too much alcohol can cause health problems. If you have a BMI higher than 25 · Your doctor may do other tests to check your risk for weight-related health problems. This may include measuring the distance around your waist. A waist measurement of more than 40 inches in men or 35 inches in women can increase the risk of weight-related health problems. · Talk with your doctor about steps you can take to stay healthy or improve your health. You may need to make lifestyle changes to lose weight and stay healthy, such as changing your diet and getting regular exercise. If you have a BMI lower than 18.5 · Your doctor may do other tests to check your risk for health problems. · Talk with your doctor about steps you can take to stay healthy or improve your health. You may need to make lifestyle changes to gain or maintain weight and stay healthy, such as getting more healthy foods in your diet and doing exercises to build muscle. Where can you learn more? Go to http://sascha-hunter.info/. Enter S176 in the search box to learn more about \"Body Mass Index: Care Instructions. \" Current as of: October 13, 2016 Content Version: 11.4 © 7539-8833 [employer]. Care instructions adapted under license by [employer] (which disclaims liability or warranty for this information). If you have questions about a medical condition or this instruction, always ask your healthcare professional. [address] any warranty or liability for your use of this information.

## 2019-05-21 LAB
25(OH)D3+25(OH)D2 SERPL-MCNC: 48.2 NG/ML (ref 30–100)
ALBUMIN SERPL-MCNC: 4.4 G/DL (ref 3.5–5.5)
ALBUMIN/GLOB SERPL: 2.2 {RATIO} (ref 1.2–2.2)
ALP SERPL-CCNC: 72 IU/L (ref 39–117)
ALT SERPL-CCNC: 19 IU/L (ref 0–32)
AST SERPL-CCNC: 19 IU/L (ref 0–40)
BILIRUB SERPL-MCNC: 0.4 MG/DL (ref 0–1.2)
BUN SERPL-MCNC: 20 MG/DL (ref 6–24)
BUN/CREAT SERPL: 21 (ref 9–23)
CALCIUM SERPL-MCNC: 9.8 MG/DL (ref 8.7–10.2)
CHLORIDE SERPL-SCNC: 103 MMOL/L (ref 96–106)
CHOLEST SERPL-MCNC: 222 MG/DL (ref 100–199)
CK SERPL-CCNC: 78 U/L (ref 24–173)
CO2 SERPL-SCNC: 25 MMOL/L (ref 20–29)
CREAT SERPL-MCNC: 0.94 MG/DL (ref 0.57–1)
ERYTHROCYTE [DISTWIDTH] IN BLOOD BY AUTOMATED COUNT: 13.5 % (ref 12.3–15.4)
EST. AVERAGE GLUCOSE BLD GHB EST-MCNC: 117 MG/DL
FOLATE SERPL-MCNC: >20 NG/ML
GLOBULIN SER CALC-MCNC: 2 G/DL (ref 1.5–4.5)
GLUCOSE SERPL-MCNC: 86 MG/DL (ref 65–99)
HBA1C MFR BLD: 5.7 % (ref 4.8–5.6)
HCT VFR BLD AUTO: 36.7 % (ref 34–46.6)
HDLC SERPL-MCNC: 85 MG/DL
HGB BLD-MCNC: 12.1 G/DL (ref 11.1–15.9)
INTERPRETATION, 910389: NORMAL
IRON SATN MFR SERPL: 28 % (ref 15–55)
IRON SERPL-MCNC: 85 UG/DL (ref 27–159)
LDLC SERPL CALC-MCNC: 121 MG/DL (ref 0–99)
MCH RBC QN AUTO: 31 PG (ref 26.6–33)
MCHC RBC AUTO-ENTMCNC: 33 G/DL (ref 31.5–35.7)
MCV RBC AUTO: 94 FL (ref 79–97)
PLATELET # BLD AUTO: 300 X10E3/UL (ref 150–450)
POTASSIUM SERPL-SCNC: 4.7 MMOL/L (ref 3.5–5.2)
PROT SERPL-MCNC: 6.4 G/DL (ref 6–8.5)
RBC # BLD AUTO: 3.9 X10E6/UL (ref 3.77–5.28)
SODIUM SERPL-SCNC: 144 MMOL/L (ref 134–144)
TIBC SERPL-MCNC: 305 UG/DL (ref 250–450)
TRIGL SERPL-MCNC: 80 MG/DL (ref 0–149)
TSH SERPL DL<=0.005 MIU/L-ACNC: 0.71 UIU/ML (ref 0.45–4.5)
UIBC SERPL-MCNC: 220 UG/DL (ref 131–425)
VIT B12 SERPL-MCNC: 1146 PG/ML (ref 232–1245)
VLDLC SERPL CALC-MCNC: 16 MG/DL (ref 5–40)
WBC # BLD AUTO: 6.2 X10E3/UL (ref 3.4–10.8)

## 2019-05-28 ENCOUNTER — TELEPHONE (OUTPATIENT)
Dept: INTERNAL MEDICINE CLINIC | Age: 59
End: 2019-05-28

## 2019-05-28 NOTE — TELEPHONE ENCOUNTER
----- Message from Ba Malhotra sent at 5/28/2019 10:03 AM EDT -----  Regarding: Dr. Toña Petersen/Telephone   Pt is requesting a call back regarding questions about a medication.  Pt's Best Contact Number: [phone]

## 2019-05-28 NOTE — TELEPHONE ENCOUNTER
Spoke with pt, see message from [address], did not take her BP meds today , felt like BP was ok, question was should she take it now, she usually takes it between 8-9 am , advised to wait and take in AM due to it being 2 pm,.will monitor BP and send message Wednesday.

## 2019-06-05 ENCOUNTER — HOSPITAL ENCOUNTER (OUTPATIENT)
Dept: MRI IMAGING | Age: 59
Discharge: HOME | End: 2019-06-05
Attending: INTERNAL MEDICINE
Payer: COMMERCIAL

## 2019-06-05 DIAGNOSIS — M54.41 CHRONIC BILATERAL LOW BACK PAIN WITH BILATERAL SCIATICA: ICD-10-CM

## 2019-06-05 DIAGNOSIS — M54.42 CHRONIC BILATERAL LOW BACK PAIN WITH BILATERAL SCIATICA: ICD-10-CM

## 2019-06-05 DIAGNOSIS — M25.559 LATERAL PAIN OF HIP: ICD-10-CM

## 2019-06-05 DIAGNOSIS — G89.29 CHRONIC BILATERAL LOW BACK PAIN WITH BILATERAL SCIATICA: ICD-10-CM

## 2019-06-05 PROCEDURE — 72148 MRI LUMBAR SPINE W/O DYE: CPT

## 2019-07-17 ENCOUNTER — TELEPHONE (OUTPATIENT)
Dept: INTERNAL MEDICINE CLINIC | Age: 59
End: 2019-07-17

## 2019-07-17 NOTE — TELEPHONE ENCOUNTER
PSR called patient and made appointment to discuss medication & MRI results in more detail. Patient was thankful for the return call & appointment.

## 2019-07-17 NOTE — TELEPHONE ENCOUNTER
----- Message from Terry Silva sent at 7/17/2019  3:19 PM EDT -----  Regarding: Rachna Maciel / telephone   General Message/Vendor Calls      Caller's first and last name:  Paul Pratt      Reason for call:      Sathya Patino required yes and why:   Patient is requesting a call back regarding test results that were received on Jasmin the 5th but is requesting to have a conversation in detail regarding results and also wanted to discuss pain management     Best contact number(s): [phone]       Details to clarify the request: Patient is requesting a call back regarding test results that were received on Jasmin the 5th but is requesting to have a conversation in detail regarding results and also wanted to discuss pain management

## 2019-07-18 ENCOUNTER — OFFICE VISIT (OUTPATIENT)
Dept: INTERNAL MEDICINE CLINIC | Age: 59
End: 2019-07-18

## 2019-07-18 VITALS
WEIGHT: 194.8 LBS | HEIGHT: 64 IN | HEART RATE: 68 BPM | TEMPERATURE: 98.5 F | SYSTOLIC BLOOD PRESSURE: 119 MMHG | BODY MASS INDEX: 33.26 KG/M2 | RESPIRATION RATE: 18 BRPM | OXYGEN SATURATION: 97 % | DIASTOLIC BLOOD PRESSURE: 70 MMHG

## 2019-07-18 DIAGNOSIS — M25.559 LATERAL PAIN OF HIP: ICD-10-CM

## 2019-07-18 DIAGNOSIS — M54.41 CHRONIC BILATERAL LOW BACK PAIN WITH BILATERAL SCIATICA: ICD-10-CM

## 2019-07-18 DIAGNOSIS — M47.27 OSTEOARTHRITIS OF SPINE WITH RADICULOPATHY, LUMBOSACRAL REGION: Primary | ICD-10-CM

## 2019-07-18 DIAGNOSIS — G89.29 CHRONIC BILATERAL LOW BACK PAIN WITH BILATERAL SCIATICA: ICD-10-CM

## 2019-07-18 DIAGNOSIS — M54.42 CHRONIC BILATERAL LOW BACK PAIN WITH BILATERAL SCIATICA: ICD-10-CM

## 2019-07-18 DIAGNOSIS — M54.16 RIGHT LUMBAR RADICULOPATHY: ICD-10-CM

## 2019-07-19 NOTE — PROGRESS NOTES
HISTORY OF PRESENT ILLNESS    Presents with lower back pain, right right buttock/thigh pains in since 10/2018. Saw PT Mesha Murrell, s/s improved some, but now have recurred in both hips and R leg. Feels some pain sitting and worse pain with walking. She can not sleep without pain. Walking hurts. Xray lumbar in the past showed arthritis per patient. MRI 6/5/19  Minimal multilevel disc and facet degenerative change. Mild canal stenosis at L4-5. Minimal canal and moderate right foraminal stenosis at L5-S1. Seeing Dr Janet Bryan for depression. Weaned off cymbalta 4/21/19. Hx gastric bypass. Review of Systems   All other systems reviewed and are negative, except as noted in HPI    Past Medical and Surgical History   has a past medical history of Abnormal Pap smear (h/o per pt- unknown age), Benign essential HTN (3/28/2018), Chronic anxiety, Chronic depression, H/O gastric bypass (2005), vertigo, IFG (impaired fasting glucose), Left lumbar radiculopathy, Neuropathy of foot, KEELEY on CPAP (1994), and Tinnitus of both ears. has a past surgical history that includes hx gastric bypass (2005); hx cholecystectomy (2009); hx orthopaedic; hx orthopaedic (2013); hx tonsillectomy (1964); hx colonoscopy (1/22/14); hx colonoscopy (2011?); hx colonoscopy (2008?); hx hysterectomy (2011 [facility] pt); and hx colonoscopy (10/10/2016). reports that she has never smoked. She has never used smokeless tobacco. She reports that she drinks about 10.0 standard drinks of alcohol per week. She reports that she does not use drugs. family history includes Colon Cancer (age of onset: 64) in her mother; Dementia in her father; Depression in her sister and son; Diabetes in her mother; Heart Disease in her brother and mother; High Cholesterol in her brother; Hypertension in her father, mother, and sister; Parkinsonism in her father; Psychiatric Disorder in her mother. Physical Exam   Nursing note and vitals reviewed.   Blood pressure 119/70, pulse 68, temperature 98.5 °F (36.9 °C), temperature source Oral, resp. rate 18, height 5' 4\" (1.626 m), weight 194 lb 12.8 oz (88.4 kg), SpO2 97 %. Constitutional: In no distress. Eyes: Conjunctivae are normal.  HEENT:  No LAD or thyromegaly   Cardiovascular: Normal rate. regular rhythm. No murmurs  No edema  Pulmonary/Chest: Effort normal. clear to ausculation blaterally  Musculoskeletal:  no edema. Mild bilateral lumbar spine tenderness with deep palpation. Mild lateral upper buttock palpation tenderness. Lateral trochanteric tenderness with palpation. Negative straight leg raise. Drop. Abd:  Neurological: Alert and oriented. Grossly intact cranial nerves and motor function. Skin: No rash noted. Psychiatric: Normal mood and affect. Behavior is normal.     Diagnoses and all orders for this visit:    1. Osteoarthritis of spine with radiculopathy, lumbosacral region  2. Right lumbar radiculopathy  3. Chronic bilateral low back pain with bilateral sciatica  Recommend consultation w pain doctor re: injections or other non-surgical tx  -     REFERRAL TO PAIN MANAGEMENT    4. Lateral pain of hip   She also may have bursitis. Consult. .     -     REFERRAL TO PAIN MANAGEMENT      lab results and schedule of future lab studies reviewed with patient  reviewed medications and side effects in detail    Return to clinic for further evaluation if new symptoms develop or if current symptoms worsen or fail to resolve. There are no Patient Instructions on file for this visit. An After Visit Summary was printed and given to the patient.

## 2019-12-08 DIAGNOSIS — I10 BENIGN ESSENTIAL HTN: ICD-10-CM

## 2019-12-08 RX ORDER — LISINOPRIL AND HYDROCHLOROTHIAZIDE 12.5; 2 MG/1; MG/1
TABLET ORAL
Qty: 180 TAB | Refills: 1 | Status: SHIPPED | OUTPATIENT
Start: 2019-12-08 | End: 2020-08-23

## 2020-02-06 ENCOUNTER — OFFICE VISIT (OUTPATIENT)
Dept: OBGYN CLINIC | Age: 60
End: 2020-02-06

## 2020-02-06 VITALS
HEIGHT: 64 IN | BODY MASS INDEX: 33.46 KG/M2 | HEART RATE: 73 BPM | SYSTOLIC BLOOD PRESSURE: 122 MMHG | WEIGHT: 196 LBS | DIASTOLIC BLOOD PRESSURE: 69 MMHG

## 2020-02-06 DIAGNOSIS — E28.39 ESTROGEN DEFICIENCY: ICD-10-CM

## 2020-02-06 DIAGNOSIS — Z01.419 ENCOUNTER FOR GYNECOLOGICAL EXAMINATION (GENERAL) (ROUTINE) WITHOUT ABNORMAL FINDINGS: Primary | ICD-10-CM

## 2020-02-06 RX ORDER — BUSPIRONE HYDROCHLORIDE 10 MG/1
TABLET ORAL
COMMUNITY
Start: 2019-12-03 | End: 2021-02-10 | Stop reason: ALTCHOICE

## 2020-02-06 RX ORDER — VITAMIN E 268 MG
CAPSULE ORAL DAILY
COMMUNITY

## 2020-02-06 RX ORDER — DULOXETIN HYDROCHLORIDE 60 MG/1
120 CAPSULE, DELAYED RELEASE ORAL DAILY
COMMUNITY

## 2020-02-06 NOTE — PROGRESS NOTES
Annual exam ages 69+ post hysterectomy    Krystina Ferris is a ,  60 y.o. female [facility] whose No LMP recorded. Patient has had a hysterectomy. who presents for her annual checkup. She is having no significant problems. Patient would like like to know if a DEXA should be done. Hormonal status:  She is not having vasomotor symptoms. The patient is not using any ERT. Sexual history:     reports previously being sexually active. Medical conditions:    Since her last annual GYN exam about 2019 ago, she has not the following changes in her health history: none. Pap and Mammogram History:    Her most recent Pap smear was normal obtained 2019 year(s) ago. The patient had her mammogram today in our office. Breast Cancer History/Substance Abuse: There is not  a history of breast cancer in her family. Tobacco History:  reports that she has never smoked. She has never used smokeless tobacco.  Alcohol Abuse:  reports current alcohol use of about 10.0 standard drinks of alcohol per week. Drug Abuse:  reports no history of drug use. Osteoporosis History:    Family history does not include a first or second degree relative with osteopenia or osteoporosis. A bone density scan was not obtained. She is currently taking calcium and vit D. Past Medical History:   Diagnosis Date    Abnormal Pap smear h/o per pt- unknown age    s/p HANSEL for fibroid. normal     Benign essential HTN 3/28/2018    Chronic anxiety     Dr. Clemente Mendiola.  Chronic depression     Dr. Meryle Ehrich H/O gastric bypass     peak wt prior 312 lb, low 165 lb (?)    Hx of vertigo     did PT in the past    IFG (impaired fasting glucose)     a1c 5.8% 2013,     Left lumbar radiculopathy     xray showed DJD per pt    Neuropathy of foot     prior to gastric bypass.  +FH,mildly painful, bilateral, stocking distribution, saw neurology in [place]    KEELEY on CPAP     dx in [place]. improved temporarily surgery 2005 w weight loss. she then used her sister CPAP 2013-1/2018    Tinnitus of both ears     for years     Past Surgical History:   Procedure Laterality Date    HX CHOLECYSTECTOMY  2009    HX COLONOSCOPY  1/22/14    normal.  repeat 3 yrs +FH,  in [place] (3rd colonoscopy)    HX COLONOSCOPY  2011?  HX COLONOSCOPY  2008?  HX COLONOSCOPY  10/10/2016    normal    HX GASTRIC BYPASS  2005    HX HYSTERECTOMY  2011 [facility] pt    due to fibroids, still has ovaries    HX ORTHOPAEDIC      middle finger right hand    HX ORTHOPAEDIC  2013    fx fibula right leg    HX TONSILLECTOMY  1964     Current Outpatient Medications   Medication Sig Dispense Refill    DULoxetine (CYMBALTA) 60 mg capsule Take 120 mg by mouth daily.  busPIRone (BUSPAR) 10 mg tablet       vitamin E (AQUA GEMS) 400 unit capsule Take  by mouth daily.  lisinopril-hydroCHLOROthiazide (PRINZIDE, ZESTORETIC) 20-12.5 mg per tablet TAKE 2 TABLETS BY MOUTH EVERY  Tab 1    ascorbic acid, vitamin C, (VITAMIN C) 500 mg tablet Take  by mouth.  lamoTRIgine (LAMICTAL) 100 mg tablet Take 150 mg by mouth daily. 0    traZODone (DESYREL) 50 mg tablet Take 1 Tab by mouth as needed.  LORazepam (ATIVAN) 1 mg tablet Take 1 mg by mouth every four (4) hours as needed for Anxiety.  MULTIVIT &MINERALS/FERROUS FUM (MULTI VITAMIN PO) Take  by mouth daily.  cyanocobalamin (VITAMIN B-12) 1,000 mcg sublingual tablet Take 1,000 mcg by mouth daily.  zinc 50 mg tab tablet Take  by mouth daily.  ferrous sulfate (IRON) 325 mg (65 mg iron) tablet Take  by mouth Daily (before breakfast).  calcium-vitamin D 600 mg(1,500mg) -200 unit tab Take 1 tablet by mouth two (2) times daily (with meals).  BIOTIN PO Take  by mouth daily.  DOCOSAHEXANOIC ACID/EPA (FISH OIL PO) Take  by mouth daily.  ASPIRIN (ASPIR-81 PO) Take  by mouth.  ASCORBATE CALCIUM (VITAMIN C PO) Take  by mouth.        Allergies: Prednisone and Wellbutrin [bupropion hcl]   Social History     Socioeconomic History    Marital status: SINGLE     Spouse name:  2014    Number of children: 1    Years of education: Not on file    Highest education level: Not on file   Occupational History    Occupation: [employer] [address] [place] [address] Needs    Financial resource strain: Not on file    Food insecurity:     Worry: Not on file     Inability: Not on file    Transportation needs:     Medical: Not on file     Non-medical: Not on file   Tobacco Use    Smoking status: Never Smoker    Smokeless tobacco: Never Used   Substance and Sexual Activity    Alcohol use: Yes     Alcohol/week: 10.0 standard drinks     Types: 4 Shots of liquor, 6 Glasses of wine per week    Drug use: No    Sexual activity: Not Currently   Lifestyle    Physical activity:     Days per week: Not on file     Minutes per session: Not on file    Stress: Not on file   Relationships    Social connections:     Talks on phone: Not on file     Gets together: Not on file     Attends Hinduism service: Not on file     Active member of club or organization: Not on file     Attends meetings of clubs or organizations: Not on file     Relationship status: Not on file    Intimate partner violence:     Fear of current or ex partner: Not on file     Emotionally abused: Not on file     Physically abused: Not on file     Forced sexual activity: Not on file   Other Topics Concern    Not on file   Social History Narrative    Son in [place].  Age 32.  she  Moved to [place] 2/2014     Patient Active Problem List   Diagnosis Code    Chronic anxiety F41.9    Chronic depression F32.9    KEELEY on CPAP G47.33, Z99.89    Neuropathy of foot G57.90    H/O gastric bypass Z98.84    IFG (impaired fasting glucose) R73.01    Left lumbar radiculopathy M54.16    Tinnitus of both ears H93.13    Benign essential HTN I10       Review of Systems - History obtained from the patient  Constitutional: negative for weight loss, fever, night sweats  HEENT: negative for hearing loss, earache, congestion, snoring, sorethroat  CV: negative for chest pain, palpitations, edema  Resp: negative for cough, shortness of breath, wheezing  GI: negative for change in bowel habits, abdominal pain, black or bloody stools  : negative for frequency, dysuria, hematuria, vaginal discharge  MSK: negative for back pain, joint pain, muscle pain  Breast: negative for breast lumps, nipple discharge, galactorrhea  Skin :negative for itching, rash, hives  Neuro: negative for dizziness, headache, confusion, weakness  Psych: negative for anxiety, depression, change in mood  Heme/lymph: negative for bleeding, bruising, pallor    Physical Exam    Visit Vitals  /69 (BP 1 Location: Left arm)   Pulse 73   Ht 5' 4\" (1.626 m)   Wt 196 lb (88.9 kg)   BMI 33.64 kg/m²     Constitutional  · Appearance: well-nourished, well developed, alert, in no acute distress    HENT  · Head and Face: appears normal    Neck  · Inspection/Palpation: normal appearance, no masses or tenderness  · Lymph Nodes: no lymphadenopathy present  · Thyroid: gland size normal, nontender, no nodules or masses present on palpation    Chest  · Respiratory Effort: breathing labored  · Auscultation: normal breath sounds    Cardiovascular  · Heart:  · Auscultation: regular rate and rhythm without murmur    Breasts  · Inspection of Breasts: breasts symmetrical, no skin changes, no discharge present, nipple appearance normal, no skin retraction present  · Palpation of Breasts and Axillae: no masses present on palpation, no breast tenderness  · Axillary Lymph Nodes: no lymphadenopathy present    Gastrointestinal  · Abdominal Examination: abdomen non-tender to palpation, normal bowel sounds, no masses present  · Liver and spleen: no hepatomegaly present, spleen not palpable  · Hernias: no hernias identified    Skin  · General Inspection: no rash, no lesions identified    Neurologic/Psychiatric  · Mental Status:  · Orientation: grossly oriented to person, place and time  · Mood and Affect: mood normal, affect appropriate    Genitourinary  · External Genitalia: normal appearance for age, no discharge present, no tenderness present, no inflammatory lesions present, no masses present, atrophy present  · Vagina: atrophic but otherwise normal vaginal vault without central or paravaginal defects, no discharge present, no inflammatory lesions present, no masses present  · Bladder: non-tender to palpation  · Urethra: appears normal  · Cervix: absent   · Uterus: absent  · Adnexa: no adnexal tenderness present, no adnexal masses present  · Perineum: perineum within normal limits, no evidence of trauma, no rashes or skin lesions present  · Anus: anus within normal limits, no hemorrhoids present  · Inguinal Lymph Nodes: no lymphadenopathy present    Assessment:  Routine gynecologic examination  Her current medical status is satisfactory with no evidence of significant gynecologic issues.     Plan:  Counseled re: diet, exercise, healthy lifestyle  Return for yearly wellness visits  Rec annual mammogram  DEXA

## 2020-02-06 NOTE — PATIENT INSTRUCTIONS
Well Visit, Women 48 to 72: Care Instructions  Your Care Instructions    Physical exams can help you stay healthy. Your doctor has checked your overall health and may have suggested ways to take good care of yourself. He or she also may have recommended tests. At home, you can help prevent illness with healthy eating, regular exercise, and other steps. Follow-up care is a key part of your treatment and safety. Be sure to make and go to all appointments, and call your doctor if you are having problems. It's also a good idea to know your test results and keep a list of the medicines you take. How can you care for yourself at home? · Reach and stay at a healthy weight. This will lower your risk for many problems, such as obesity, diabetes, heart disease, and high blood pressure. · Get at least 30 minutes of exercise on most days of the week. Walking is a good choice. You also may want to do other activities, such as running, swimming, cycling, or playing tennis or team sports. · Do not smoke. Smoking can make health problems worse. If you need help quitting, talk to your doctor about stop-smoking programs and medicines. These can increase your chances of quitting for good. · Protect your skin from too much sun. When you're outdoors from 10 a.m. to 4 p.m., stay in the shade or cover up with clothing and a hat with a wide brim. Wear sunglasses that block UV rays. Even when it's cloudy, put broad-spectrum sunscreen (SPF 30 or higher) on any exposed skin. · See a dentist one or two times a year for checkups and to have your teeth cleaned. · Wear a seat belt in the car. Follow your doctor's advice about when to have certain tests. These tests can spot problems early. · Cholesterol. Your doctor will tell you how often to have this done based on your age, family history, or other things that can increase your risk for heart attack and stroke. · Blood pressure.  Have your blood pressure checked during a routine doctor visit. Your doctor will tell you how often to check your blood pressure based on your age, your blood pressure results, and other factors. · Mammogram. Ask your doctor how often you should have a mammogram, which is an X-ray of your breasts. A mammogram can spot breast cancer before it can be felt and when it is easiest to treat. · Pap test and pelvic exam. Ask your doctor how often you should have a Pap test. You may not need to have a Pap test as often as you used to. · Vision. Have your eyes checked every year or two or as often as your doctor suggests. Some experts recommend that you have yearly exams for glaucoma and other age-related eye problems starting at age 48. · Hearing. Tell your doctor if you notice any change in your hearing. You can have tests to find out how well you hear. · Diabetes. Ask your doctor whether you should have tests for diabetes. · Colorectal cancer. Your risk for colorectal cancer gets higher as you get older. Some experts say that adults should start regular screening at age 48 and stop at age 76. Others say to start before age 48 or continue after age 76. Talk with your doctor about your risk and when to start and stop screening. · Thyroid disease. Talk to your doctor about whether to have your thyroid checked as part of a regular physical exam. Women have an increased chance of a thyroid problem. · Osteoporosis. You should begin tests for bone density at age 72. If you are younger than 72, ask your doctor whether you have factors that may increase your risk for this disease. You may want to have this test before age 72. · Heart attack and stroke risk. At least every 4 to 6 years, you should have your risk for heart attack and stroke assessed. Your doctor uses factors such as your age, blood pressure, cholesterol, and whether you smoke or have diabetes to show what your risk for a heart attack or stroke is over the next 10 years.   When should you call for help?  Watch closely for changes in your health, and be sure to contact your doctor if you have any problems or symptoms that concern you. Where can you learn more? Go to http://sascha-hunter.info/. Enter D665 in the search box to learn more about \"Well Visit, Women 50 to 72: Care Instructions. \"  Current as of: December 13, 2018  Content Version: 12.2  © 1550-9936 [employer]. Care instructions adapted under license by [employer] (which disclaims liability or warranty for this information). If you have questions about a medical condition or this instruction, always ask your healthcare professional. [address] any warranty or liability for your use of this information.

## 2020-02-10 ENCOUNTER — OFFICE VISIT (OUTPATIENT)
Dept: INTERNAL MEDICINE CLINIC | Age: 60
End: 2020-02-10

## 2020-02-10 ENCOUNTER — TELEPHONE (OUTPATIENT)
Dept: INTERNAL MEDICINE CLINIC | Age: 60
End: 2020-02-10

## 2020-02-10 VITALS
HEART RATE: 74 BPM | OXYGEN SATURATION: 98 % | SYSTOLIC BLOOD PRESSURE: 115 MMHG | DIASTOLIC BLOOD PRESSURE: 72 MMHG | RESPIRATION RATE: 18 BRPM | HEIGHT: 64 IN | BODY MASS INDEX: 33.53 KG/M2 | WEIGHT: 196.38 LBS | TEMPERATURE: 98.8 F

## 2020-02-10 DIAGNOSIS — R07.9 CHEST PAIN, UNSPECIFIED TYPE: Primary | ICD-10-CM

## 2020-02-10 NOTE — TELEPHONE ENCOUNTER
----- Message from Narendra Shantel sent at 2/10/2020 10:25 AM EST -----  Regarding: Dr. Shell Olivera first and last name and relationship (if not the patient):  Best contact number(s): [phone]  What are the symptoms: chest pains between the breast, had an \"episode\", did not give what was the specifics, but after the episode was over with, she had her vitals checked at podiatrist office that she was leaving  Transfer successful - yes/no (include outcome): yes, got transferred over to  [facility]  Transfer declined - yes/no (include reason): no  Was caller advised to seek appropriate level of care - yes/no: n/a   Details to clarify the request:

## 2020-02-10 NOTE — TELEPHONE ENCOUNTER
Spoke with Pt, states she was at the [address] office for a shoulder Manipulation , after the manipulation she had some pain in shoulder blades and in between breast , no shortness of breath but was told to call and make appointment with PCP, VS were normal 121/72,HR 73, declined ER , has appointment this afternoon with another provider.

## 2020-02-10 NOTE — PROGRESS NOTES
Assessment and Plan   Diagnoses and all orders for this visit:    1. Chest pain, unspecified type  -     AMB POC EKG ROUTINE W/ 12 LEADS, INTER & REP  EKG with normal sinus rhythm with no ST elevations or depressions or T wave inversions. Pain most likely referred pain from right shoulder manipulation. History of negative stress test in 2018. Advised patient to give us a call or go to the ER if she ever develops similar symptoms. Would consider stress test if develop symptoms again. Return to clinic: As scheduled    Yareli Gerardo MD  [facility]  2/10/2020    Future Appointments   Date Time Provider Nicola Owens   3/30/2020  2:40 PM Tali Gallegos MD [address]        Subjective   Chief Complaint   Chest pain    Nbuia Ortega is a 60 y.o. female     Patient presents to clinic for evaluation of chest pain. She was in her orthopedic office earlier for evaluation of right shoulder pain during which they manipulated her right shoulder a lot. After that, she developed substernal chest pain described as a \"elephant sitting on my chest\" radiating to the epigastric area associated with nausea, clammy, mildly lightheaded. Lasted for about 15 minutes. Reports that she had a similar episode back in 2018. Subsequent stress test was negative. Family history of a stroke in her mom but no heart attacks. Denies any chest pain or shortness of breath with exertion otherwise. Was in [facility] this [place]. Denies any current chest pain. Review of Systems   Constitutional: Negative for chills and fever. Respiratory: Negative for shortness of breath. Cardiovascular: Negative for chest pain.         Objective   Vitals:       Visit Vitals  /72 (BP 1 Location: Left arm, BP Patient Position: Sitting)   Pulse 74   Temp 98.8 °F (37.1 °C) (Oral)   Resp 18   Ht 5' 4\" (1.626 m)   Wt 196 lb 6 oz (89.1 kg) SpO2 98%   BMI 33.71 kg/m²        Physical Exam  Constitutional:       Appearance: Normal appearance. Cardiovascular:      Rate and Rhythm: Normal rate and regular rhythm. Heart sounds: No murmur. No friction rub. No gallop. Pulmonary:      Effort: No respiratory distress. Breath sounds: No wheezing, rhonchi or rales. Neurological:      Mental Status: She is alert.           Voice recognition software is utilized and this note may contain transcription errors

## 2020-02-10 NOTE — TELEPHONE ENCOUNTER
If pain occurred during manipulation, this is likely MSK pain and it is ok to observe. .  If SOB or in distress, then to ER.   I dont think office visit is likely to be helpful unable to assess

## 2020-02-26 ENCOUNTER — HOSPITAL ENCOUNTER (OUTPATIENT)
Dept: MAMMOGRAPHY | Age: 60
Discharge: HOME | End: 2020-02-26
Attending: OBSTETRICS & GYNECOLOGY
Payer: COMMERCIAL

## 2020-02-26 DIAGNOSIS — E28.39 ESTROGEN DEFICIENCY: ICD-10-CM

## 2020-02-26 PROCEDURE — 77080 DXA BONE DENSITY AXIAL: CPT

## 2020-06-10 ENCOUNTER — TELEPHONE (OUTPATIENT)
Dept: INTERNAL MEDICINE CLINIC | Age: 60
End: 2020-06-10

## 2020-06-10 NOTE — TELEPHONE ENCOUNTER
Faxed requested information to [facility] (fax# [phone]). Successful fax transmission report received.

## 2020-06-10 NOTE — TELEPHONE ENCOUNTER
Terri Manrique with [facility] is requesting Patient's most recent EKG , an EKG from 3 yrs ago completed by cardiologist if we have it and her stress test done in 2018. Fx# [phone]  .  States the info is needed for anesthesia purposes

## 2020-08-22 DIAGNOSIS — I10 BENIGN ESSENTIAL HTN: ICD-10-CM

## 2020-08-23 RX ORDER — LISINOPRIL AND HYDROCHLOROTHIAZIDE 12.5; 2 MG/1; MG/1
TABLET ORAL
Qty: 180 TAB | Refills: 1 | Status: SHIPPED | OUTPATIENT
Start: 2020-08-23 | End: 2020-08-24 | Stop reason: SDUPTHER

## 2020-08-24 ENCOUNTER — TELEPHONE (OUTPATIENT)
Dept: INTERNAL MEDICINE CLINIC | Age: 60
End: 2020-08-24

## 2020-08-24 DIAGNOSIS — I10 BENIGN ESSENTIAL HTN: ICD-10-CM

## 2020-08-24 RX ORDER — LISINOPRIL AND HYDROCHLOROTHIAZIDE 12.5; 2 MG/1; MG/1
TABLET ORAL
Qty: 14 TAB | Refills: 0 | Status: SHIPPED | OUTPATIENT
Start: 2020-08-24 | End: 2020-11-05

## 2020-08-24 NOTE — TELEPHONE ENCOUNTER
Pt is out of state in [facility] and needs 7 day supply of her lisinopril  Sent to local pharm, she does not  have her meds with her.  Pharm is [facility] [phone]

## 2020-08-26 NOTE — TELEPHONE ENCOUNTER
Reached out to patient to assist w/ scheduling a medication evaluation / follow up appointment per PCP.  Patient has been scheduled for 10/02 @ 1:40 - thank you

## 2020-11-05 ENCOUNTER — OFFICE VISIT (OUTPATIENT)
Dept: INTERNAL MEDICINE CLINIC | Age: 60
End: 2020-11-05

## 2020-11-05 ENCOUNTER — TELEPHONE (OUTPATIENT)
Dept: INTERNAL MEDICINE CLINIC | Age: 60
End: 2020-11-05

## 2020-11-05 VITALS
HEIGHT: 64 IN | SYSTOLIC BLOOD PRESSURE: 96 MMHG | RESPIRATION RATE: 12 BRPM | BODY MASS INDEX: 31.79 KG/M2 | DIASTOLIC BLOOD PRESSURE: 63 MMHG | HEART RATE: 81 BPM | WEIGHT: 186.2 LBS | OXYGEN SATURATION: 98 % | TEMPERATURE: 98.4 F

## 2020-11-05 DIAGNOSIS — R55 SYNCOPE, UNSPECIFIED SYNCOPE TYPE: Primary | ICD-10-CM

## 2020-11-05 DIAGNOSIS — I95.1 ORTHOSTATIC HYPOTENSION: ICD-10-CM

## 2020-11-05 PROCEDURE — 99214 OFFICE O/P EST MOD 30 MIN: CPT | Performed by: INTERNAL MEDICINE

## 2020-11-05 RX ORDER — QUETIAPINE FUMARATE 100 MG/1
150 TABLET, FILM COATED ORAL 2 TIMES DAILY
COMMUNITY
End: 2021-02-10 | Stop reason: ALTCHOICE

## 2020-11-05 NOTE — TELEPHONE ENCOUNTER
----- Message from [address] sent at 11/5/2020  9:01 AM EST -----  Regarding: Dr. Hussein Rincon Message/Vendor Calls    Caller's first and last name: Pt      Reason for call: Medical Records      Callback required yes/no and why: Yes      Best contact number(s): [phone]      Details to clarify the request: Pt requesting medical records; pt unsure of fax # at this time, however stated they will provide information during follow up call.         Yuliya Hernandez

## 2020-11-05 NOTE — PATIENT INSTRUCTIONS
Stop your blood pressure medication Orthostatic Hypotension: Care Instructions Your Care Instructions Orthostatic hypotension is a quick drop in blood pressure. It happens when you get up from sitting or lying down. You may feel faint, lightheaded, or dizzy. When a person sits up or stands up, the body changes the way it pumps blood. This can slow the flow of blood to the brain for a very short time. And that can make you feel lightheaded. Many medicines can cause this problem, especially in older people. Lack of fluids (dehydration) or illnesses such as diabetes or heart disease also can cause it. Follow-up care is a key part of your treatment and safety. Be sure to make and go to all appointments, and call your doctor if you are having problems. It's also a good idea to know your test results and keep a list of the medicines you take. How can you care for yourself at home? · Be safe with medicines. Call your doctor if you think you are having a problem with your medicine. You will get more details on the specific medicines your doctor prescribes. · If you feel dizzy or lightheaded, sit down or lie down for a few minutes. Or you can sit down and put your head between your knees. This will help your blood pressure go back to normal and help your symptoms go away. · Follow your doctor's suggestions for ways to prevent symptoms like dizziness. These suggestions may include: ? Get up slowly from bed or after sitting for a long time. If you are in bed, roll to your side and swing your legs over the edge of the bed and onto the floor. Push your body up to a sitting position. Wait for a while before you slowly stand up. 
? Add more salt to your diet, if your doctor recommends it. ? Drink plenty of fluids, enough so that your urine is light yellow or clear like water. Choose water and other caffeine-free clear liquids.  If you have kidney, heart, or liver disease and have to limit fluids, talk with your doctor before you increase the amount of fluids you drink. ? Avoid or limit alcohol to 2 drinks a day for men and 1 drink a day for women. Alcohol may interfere with your medicine. In addition, alcohol can make your low blood pressure worse by causing your body to lose water. ? Avoid or limit caffeine. Caffeine can cause your body to lose water. ? Wear compression stockings to help improve blood flow. When should you call for help? Call 911 anytime you think you may need emergency care. For example, call if: 
  · You passed out (lost consciousness). Watch closely for changes in your health, and be sure to contact your doctor if: 
  · You do not get better as expected. Where can you learn more? Go to http://www.gray.com/ Enter E715 in the search box to learn more about \"Orthostatic Hypotension: Care Instructions. \" Current as of: December 16, 2019               Content Version: 12.6 © 6730-1293 [employer]. Care instructions adapted under license by [employer] (which disclaims liability or warranty for this information). If you have questions about a medical condition or this instruction, always ask your healthcare professional. [place] [address] any warranty or liability for your use of this information.

## 2020-11-05 NOTE — PROGRESS NOTES
Assessment and Plan   Diagnoses and all orders for this visit:    1. Syncope, unspecified syncope type  2. Orthostatic hypotension  Presents to clinic for evaluation of syncope. Reports that she had her first episode in 2016 and subsequently had sporadic episodes since then but seems to have been gotten worse in the past 2 months. Almost always occur after she stands upshe had one episode when she was in the bathroom and passed out after urinating and then passed out again after standing up. Episodes last for a couple seconds. Says she can feel herself shaking and knows that it is happening. Associated with some flushed feeling. Denies any associated chest pain, shortness of breath, palpitations, headache, confusion. She initially attributed these episodes to alcohol but she has not had any alcohol since September and is still having symptoms. She has lost some weight and has noticed that her blood pressure started to decrease so she took herself off of medications but then restarted recently. Blood pressure after these episodes can be as low as systolic in the 70L. Reports that she has had poor motivation over the past couple months and has not been eating and drinking as much over that time. Orthostatics are positive  Laying 93/59, HR 73  Sitting 94/63, HR 74  Standing 70/49, HR 82    Symptoms most likely secondary to orthostatic hypotension. She had an EKG in February showing normal sinus rhythm. She has no other cardiac symptoms that point towards a cardiac cause. Recommend stopping lisinopril hydrochlorothiazide. Recommend increasing salt intake and increasing fluid intake. Could try compression stockings. Recommend getting up slowly. Advised to monitor blood pressure daily. If patient is still symptomatic off of blood pressure medications and her blood pressure allows, could consider medications for orthostatic hypotension.       Benefits, risks, possible drug interactions, and side effects of all new medications were reviewed with the patient. Pt verbalized understanding. Return to clinic: 1 month for follow-up    John Forrester MD  [facility]  11/5/2020    Future Appointments   Date Time Provider Nicola Graciela   12/4/2020 11:00 AM Neil [facility]        Subjective   Chief Complaint   Syncope    Eyal Triana is a 63 y.o. female         Review of Systems   Constitutional: Negative for chills and fever. Respiratory: Negative for shortness of breath. Cardiovascular: Negative for chest pain. Objective   Vitals:       Visit Vitals  BP 96/63 (BP 1 Location: Left arm, BP Patient Position: Sitting)   Pulse 81   Temp 98.4 °F (36.9 °C) (Oral)   Resp 12   Ht 5' 4\" (1.626 m)   Wt 186 lb 3.2 oz (84.5 kg)   SpO2 98%   BMI 31.96 kg/m²        Physical Exam  Constitutional:       Appearance: Normal appearance. She is not ill-appearing. Cardiovascular:      Rate and Rhythm: Normal rate and regular rhythm. Heart sounds: No murmur. No friction rub. Pulmonary:      Effort: No respiratory distress. Breath sounds: Normal breath sounds. No wheezing, rhonchi or rales. Abdominal:      General: Bowel sounds are normal. There is no distension. Palpations: Abdomen is soft. There is no mass. Tenderness: There is no abdominal tenderness. There is no guarding. Neurological:      Mental Status: She is alert and oriented to person, place, and time. Cranial Nerves: No cranial nerve deficit. Sensory: No sensory deficit. Motor: No weakness. Coordination: Coordination normal.      Gait: Gait normal.   Psychiatric:         Mood and Affect: Mood normal.         Thought Content: Thought content normal.         Judgment: Judgment normal.          Current Outpatient Medications   Medication Sig    QUEtiapine (SEROqueL) 100 mg tablet Take 150 mg by mouth two (2) times a day.     lisinopril-hydroCHLOROthiazide (PRINZIDE, ZESTORETIC) 20-12.5 mg per tablet TAKE 2 TABLETS BY MOUTH EVERY DAY    DULoxetine (CYMBALTA) 60 mg capsule Take 120 mg by mouth daily.  busPIRone (BUSPAR) 10 mg tablet     ascorbic acid, vitamin C, (VITAMIN C) 500 mg tablet Take  by mouth.  lamoTRIgine (LAMICTAL) 100 mg tablet Take 200 mg by mouth daily.  MULTIVIT &MINERALS/FERROUS FUM (MULTI VITAMIN PO) Take  by mouth daily.  cyanocobalamin (VITAMIN B-12) 1,000 mcg sublingual tablet Take 1,000 mcg by mouth daily.  ASCORBATE CALCIUM (VITAMIN C PO) Take  by mouth.  zinc 50 mg tab tablet Take  by mouth daily.  ferrous sulfate (IRON) 325 mg (65 mg iron) tablet Take  by mouth Daily (before breakfast).  calcium-vitamin D 600 mg(1,500mg) -200 unit tab Take 1 tablet by mouth two (2) times daily (with meals).  BIOTIN PO Take  by mouth daily.  DOCOSAHEXANOIC ACID/EPA (FISH OIL PO) Take  by mouth daily.  vitamin E (AQUA GEMS) 400 unit capsule Take  by mouth daily. Indications: not taking    traZODone (DESYREL) 50 mg tablet Take 1 Tab by mouth as needed. Indications: not taking    ASPIRIN (ASPIR-81 PO) Take  by mouth. Indications: not taking    LORazepam (ATIVAN) 1 mg tablet Take 1 mg by mouth every four (4) hours as needed for Anxiety. Indications: not taking     No current facility-administered medications for this visit.

## 2021-02-10 ENCOUNTER — OFFICE VISIT (OUTPATIENT)
Dept: INTERNAL MEDICINE CLINIC | Age: 61
End: 2021-02-10
Payer: MEDICAID

## 2021-02-10 ENCOUNTER — HOSPITAL ENCOUNTER (OUTPATIENT)
Dept: GENERAL RADIOLOGY | Age: 61
Discharge: HOME | End: 2021-02-10
Attending: INTERNAL MEDICINE
Payer: MEDICAID

## 2021-02-10 VITALS
HEART RATE: 68 BPM | BODY MASS INDEX: 32.06 KG/M2 | HEIGHT: 64 IN | WEIGHT: 187.8 LBS | TEMPERATURE: 98.6 F | RESPIRATION RATE: 14 BRPM | SYSTOLIC BLOOD PRESSURE: 128 MMHG | OXYGEN SATURATION: 97 % | DIASTOLIC BLOOD PRESSURE: 65 MMHG

## 2021-02-10 DIAGNOSIS — R19.01 RIGHT UPPER QUADRANT ABDOMINAL MASS: ICD-10-CM

## 2021-02-10 DIAGNOSIS — I10 BENIGN ESSENTIAL HTN: Primary | ICD-10-CM

## 2021-02-10 DIAGNOSIS — Z13.21 ENCOUNTER FOR VITAMIN DEFICIENCY SCREENING: ICD-10-CM

## 2021-02-10 DIAGNOSIS — E55.9 VITAMIN D DEFICIENCY: ICD-10-CM

## 2021-02-10 DIAGNOSIS — M25.511 STERNOCLAVICULAR JOINT PAIN, RIGHT: ICD-10-CM

## 2021-02-10 DIAGNOSIS — R73.01 IFG (IMPAIRED FASTING GLUCOSE): ICD-10-CM

## 2021-02-10 DIAGNOSIS — E61.1 IRON DEFICIENCY: ICD-10-CM

## 2021-02-10 DIAGNOSIS — Z98.84 H/O GASTRIC BYPASS: ICD-10-CM

## 2021-02-10 PROCEDURE — 99214 OFFICE O/P EST MOD 30 MIN: CPT | Performed by: INTERNAL MEDICINE

## 2021-02-10 PROCEDURE — 71130 X-RAY STRENOCLAVIC JT 3/>VWS: CPT

## 2021-02-10 RX ORDER — LISINOPRIL 20 MG/1
20 TABLET ORAL DAILY
Qty: 30 TAB | Refills: 2 | Status: SHIPPED | OUTPATIENT
Start: 2021-02-10 | End: 2021-04-27 | Stop reason: SDUPTHER

## 2021-02-10 RX ORDER — MULTIVIT WITH MINERALS/HERBS
1 TABLET ORAL DAILY
COMMUNITY

## 2021-02-10 NOTE — PROGRESS NOTES
HISTORY OF PRESENT ILLNESS    Chief Complaint   Patient presents with    Mass     Neck - just noticed a couple of weeks ago.  Medication Evaluation    Blood Pressure Check       Presents for follow-up    Due to insurance, she was last seen by me 7/2019. Bought a new puppy this week and is busy, but happy about it,. Right abdominal mass. She noticed it by pushing on abdomen over 1 year ago. Saw GYN and was asked to see PCP. It is not painful, but she notices it when she pushes on it. Colonoscopy 10/2019 showed 2 mm polyp. +FH colon cancer in her mother at age 64. Denies any major change in bowel habits. Denies any nausea. Weight has been stable. She does have a history of cholecystectomy. Also has a history of gastric bypass surgery in 2005. She does not think that this firm region was present after that surgery. Hx HTN. Seen 11/5/20 for syncope w orthostatic hypotension. Was asked to stop lisinopril and HCTZ then. BPs sometimes 140s/ off medications. Denies etoh use. BP does increase with drinking. On 1/7/21, she started 20-12.5 mg lisinopril-hctz due to BP being 148/80.  1/11/21, /63 and was dizzy on standing, but continued lisinopril. Stopped med 1/17 due to dizziness and BP was 140-148/70  End of Jan, resumed med until 2/7  When /61  Has taken no med over the past 3 days    Depression/anxiety  follow-up  Patient overall feels her depression is stable now  Seeing Dr. Jen Velasco  Current symptoms include depressed mood. Treatment: she weaned off lamictal and buspar and is taking cymbalta, trazodone, ativan   Patient does not see a counselor. Could not tolerate seroquel. Denies current suicidal and homicidal plan or intent. Side effects from the treatment: none.       Review of Systems   All other systems reviewed and are negative, except as noted in HPI    Past Medical and Surgical History   has a past medical history of Abnormal Pap smear (h/o per pt- unknown age), Benign essential HTN (3/28/2018), Chronic anxiety, Chronic depression, H/O gastric bypass (2005), vertigo, IFG (impaired fasting glucose), Left lumbar radiculopathy, Neuropathy of foot, KEELEY on CPAP (1994), and Tinnitus of both ears. has a past surgical history that includes hx gastric bypass (2005); hx cholecystectomy (2009); hx tonsillectomy (1964); hx colonoscopy (1/22/14); hx colonoscopy (2011?); hx colonoscopy (2008?); hx hysterectomy (2011 [facility] pt); hx colonoscopy (10/10/2016); hx orthopaedic; hx orthopaedic (2013); and hx orthopaedic (Right, 06/2020). reports that she has never smoked. She has never used smokeless tobacco. She reports current alcohol use of about 10.0 standard drinks of alcohol per week. She reports that she does not use drugs. family history includes Colon Cancer (age of onset: 64) in her mother; Dementia in her father; Depression in her sister and son; Diabetes in her mother; Heart Disease in her brother and mother; High Cholesterol in her brother; Hypertension in her father, mother, and sister; Parkinsonism in her father; Psychiatric Disorder in her mother. Physical Exam   Nursing note and vitals reviewed. Blood pressure 128/65, pulse 68, temperature 98.6 °F (37 °C), resp. rate 14, height 5' 4\" (1.626 m), weight 187 lb 12.8 oz (85.2 kg), SpO2 97 %. Constitutional:  No distress. Eyes: Conjunctivae are normal.   Ears:  Hearing grossly intact  Cardiovascular: Normal rate. regular rhythm, no murmurs or gallops   Chest wall shows some mild prominence of the right clavicular head where it meets the sternum. Pulmonary/Chest: Effort normal.   CTAB  Musculoskeletal: moves all 4 extremities   abd -right sided abdominal mass, lateral from umbilicus. Not truly right upper quadrant. Approximately 6 x 8 cm and firm. No obvious hepatosplenomegaly. Neurological: Alert and oriented to person, place, and time. Skin: No visible rash noted. Psychiatric: Normal mood and affect. Behavior is normal.     ASSESSMENT and PLAN  Diagnoses and all orders for this visit:    1. Benign essential HTN  This condition is controlled on current medication regimen as written in medication list.  Medications refilled. -     lisinopriL (PRINIVIL, ZESTRIL) 20 mg tablet; Take 1 Tab by mouth daily.  -     METABOLIC PANEL, COMPREHENSIVE; Future  -     CBC WITH AUTOMATED DIFF; Future  -     LIPID PANEL; Future    2. Right-sided abdominal mass  Rather large palpable mass of the right abdomen. It is not very symptomatic. Colonoscopy was last done 16 months ago. Does have a family history of colonoscopy however. Could this be scar tissue from her gastric bypass? Recommend abdominal CT scan to further evaluate. Could consider referral to GI or surgery based on findings.  -     METABOLIC PANEL, COMPREHENSIVE; Future  -     CBC WITH AUTOMATED DIFF; Future  -     CT ABD PELV W CONT; Future    3. H/O gastric bypass  -     CT ABD PELV W CONT; Future    4. IFG (impaired fasting glucose)  The patient is asked to make an attempt to improve diet and exercise patterns to aid in medical management of this problem  -     HEMOGLOBIN A1C WITH EAG; Future    5. Sternoclavicular prominence, right. Reassured this is likely just some asymmetry of her clavicular head. X-ray shows nothing abnormal.  -     XR SC JTS (MIN 3 V); Future    6. Encounter for vitamin deficiency screening  7. Vitamin D deficiency  -     VITAMIN D, 25 HYDROXY; Future    8. Iron deficiency  -     FERRITIN; Future      lab results and schedule of future lab studies reviewed with patient  reviewed medications and side effects in detail    Return to clinic for further evaluation if new symptoms develop        Current Outpatient Medications   Medication Sig    b complex vitamins tablet Take 1 Tab by mouth daily.  lisinopriL (PRINIVIL, ZESTRIL) 20 mg tablet Take 1 Tab by mouth daily.  DULoxetine (CYMBALTA) 60 mg capsule Take 120 mg by mouth daily.     vitamin E (AQUA GEMS) 400 unit capsule Take  by mouth daily. Indications: not taking    ascorbic acid, vitamin C, (VITAMIN C) 500 mg tablet Take  by mouth.  traZODone (DESYREL) 50 mg tablet Take 1 Tab by mouth as needed. Indications: not taking    LORazepam (ATIVAN) 1 mg tablet Take 1 mg by mouth every four (4) hours as needed for Anxiety. Indications: not taking    MULTIVIT &MINERALS/FERROUS FUM (MULTI VITAMIN PO) Take  by mouth daily.  zinc 50 mg tab tablet Take  by mouth daily.  ferrous sulfate (IRON) 325 mg (65 mg iron) tablet Take  by mouth Daily (before breakfast).  calcium-vitamin D 600 mg(1,500mg) -200 unit tab Take 1 tablet by mouth two (2) times daily (with meals).  BIOTIN PO Take  by mouth daily.  DOCOSAHEXANOIC ACID/EPA (FISH OIL PO) Take  by mouth daily. No current facility-administered medications for this visit.

## 2021-02-11 LAB
25(OH)D3 SERPL-MCNC: 48 NG/ML (ref 30–100)
ALBUMIN SERPL-MCNC: 3.9 G/DL (ref 3.5–5)
ALBUMIN/GLOB SERPL: 1.4 {RATIO} (ref 1.1–2.2)
ALP SERPL-CCNC: 66 U/L (ref 45–117)
ALT SERPL-CCNC: 24 U/L (ref 12–78)
ANION GAP SERPL CALC-SCNC: 6 MMOL/L (ref 5–15)
AST SERPL-CCNC: 23 U/L (ref 15–37)
BASOPHILS # BLD: 0.1 K/UL (ref 0–0.1)
BASOPHILS NFR BLD: 1 % (ref 0–1)
BILIRUB SERPL-MCNC: 0.4 MG/DL (ref 0.2–1)
BUN SERPL-MCNC: 28 MG/DL (ref 6–20)
BUN/CREAT SERPL: 29 (ref 12–20)
CALCIUM SERPL-MCNC: 9.7 MG/DL (ref 8.5–10.1)
CHLORIDE SERPL-SCNC: 107 MMOL/L (ref 97–108)
CHOLEST SERPL-MCNC: 212 MG/DL
CO2 SERPL-SCNC: 28 MMOL/L (ref 21–32)
CREAT SERPL-MCNC: 0.98 MG/DL (ref 0.55–1.02)
DIFFERENTIAL METHOD BLD: ABNORMAL
EOSINOPHIL # BLD: 0.2 K/UL (ref 0–0.4)
EOSINOPHIL NFR BLD: 3 % (ref 0–7)
ERYTHROCYTE [DISTWIDTH] IN BLOOD BY AUTOMATED COUNT: 13 % (ref 11.5–14.5)
EST. AVERAGE GLUCOSE BLD GHB EST-MCNC: 105 MG/DL
FERRITIN SERPL-MCNC: 371 NG/ML (ref 8–252)
GLOBULIN SER CALC-MCNC: 2.8 G/DL (ref 2–4)
GLUCOSE SERPL-MCNC: 86 MG/DL (ref 65–100)
HBA1C MFR BLD: 5.3 % (ref 4–5.6)
HCT VFR BLD AUTO: 35.7 % (ref 35–47)
HDLC SERPL-MCNC: 78 MG/DL
HDLC SERPL: 2.7 {RATIO} (ref 0–5)
HGB BLD-MCNC: 11.5 G/DL (ref 11.5–16)
IMM GRANULOCYTES # BLD AUTO: 0 K/UL (ref 0–0.04)
IMM GRANULOCYTES NFR BLD AUTO: 1 % (ref 0–0.5)
LDLC SERPL CALC-MCNC: 119 MG/DL (ref 0–100)
LIPID PROFILE,FLP: ABNORMAL
LYMPHOCYTES # BLD: 2.2 K/UL (ref 0.8–3.5)
LYMPHOCYTES NFR BLD: 37 % (ref 12–49)
MCH RBC QN AUTO: 30.8 PG (ref 26–34)
MCHC RBC AUTO-ENTMCNC: 32.2 G/DL (ref 30–36.5)
MCV RBC AUTO: 95.7 FL (ref 80–99)
MONOCYTES # BLD: 0.6 K/UL (ref 0–1)
MONOCYTES NFR BLD: 10 % (ref 5–13)
NEUTS SEG # BLD: 3 K/UL (ref 1.8–8)
NEUTS SEG NFR BLD: 48 % (ref 32–75)
NRBC # BLD: 0 K/UL (ref 0–0.01)
NRBC BLD-RTO: 0 PER 100 WBC
PLATELET # BLD AUTO: 254 K/UL (ref 150–400)
PMV BLD AUTO: 10.8 FL (ref 8.9–12.9)
POTASSIUM SERPL-SCNC: 3.8 MMOL/L (ref 3.5–5.1)
PROT SERPL-MCNC: 6.7 G/DL (ref 6.4–8.2)
RBC # BLD AUTO: 3.73 M/UL (ref 3.8–5.2)
SODIUM SERPL-SCNC: 141 MMOL/L (ref 136–145)
TRIGL SERPL-MCNC: 75 MG/DL (ref ?–150)
VLDLC SERPL CALC-MCNC: 15 MG/DL
WBC # BLD AUTO: 6 K/UL (ref 3.6–11)

## 2021-02-22 ENCOUNTER — HOSPITAL ENCOUNTER (OUTPATIENT)
Dept: CT IMAGING | Age: 61
Discharge: HOME | End: 2021-02-22
Attending: INTERNAL MEDICINE
Payer: MEDICAID

## 2021-02-22 DIAGNOSIS — R19.01 RIGHT UPPER QUADRANT ABDOMINAL MASS: ICD-10-CM

## 2021-02-22 DIAGNOSIS — Z98.84 H/O GASTRIC BYPASS: ICD-10-CM

## 2021-02-22 PROCEDURE — 74011000636 HC RX REV CODE- 636: Performed by: INTERNAL MEDICINE

## 2021-02-22 PROCEDURE — 74177 CT ABD & PELVIS W/CONTRAST: CPT

## 2021-02-22 RX ADMIN — IOPAMIDOL 100 ML: 755 INJECTION, SOLUTION INTRAVENOUS at 13:32

## 2021-02-25 ENCOUNTER — OFFICE VISIT (OUTPATIENT)
Dept: INTERNAL MEDICINE CLINIC | Age: 61
End: 2021-02-25
Payer: MEDICAID

## 2021-02-25 VITALS
BODY MASS INDEX: 31.41 KG/M2 | HEIGHT: 64 IN | OXYGEN SATURATION: 99 % | SYSTOLIC BLOOD PRESSURE: 120 MMHG | WEIGHT: 184 LBS | RESPIRATION RATE: 16 BRPM | DIASTOLIC BLOOD PRESSURE: 69 MMHG | TEMPERATURE: 99.1 F | HEART RATE: 75 BPM

## 2021-02-25 DIAGNOSIS — T14.8XXA BLISTER: Primary | ICD-10-CM

## 2021-02-25 DIAGNOSIS — L30.4 INTERTRIGO: ICD-10-CM

## 2021-02-25 PROCEDURE — 99213 OFFICE O/P EST LOW 20 MIN: CPT | Performed by: INTERNAL MEDICINE

## 2021-02-25 RX ORDER — KETOCONAZOLE 20 MG/G
CREAM TOPICAL DAILY
Qty: 60 G | Refills: 0 | Status: SHIPPED | OUTPATIENT
Start: 2021-02-25

## 2021-02-25 NOTE — PROGRESS NOTES
Assessment and Plan   Diagnoses and all orders for this visit:    1. Blister  Reports that she had a blister on her toe for a long time that finally popped about 3 weeks ago. Continues to have some darker discoloration of the skin and hard skin. No evidence for infection. Recommend salt soaks a couple times a day to help soften skin to aid with removal.  Advised to monitor for signs of infection and let us know if symptoms do not improve or worsen. 2. Intertrigo  -     ketoconazole (NIZORAL) 2 % topical cream; Apply  to affected area daily.  -     REFERRAL TO PLASTIC SURGERY  Reports recurrent groin intertrigo after her bypass surgery. Currently has a large patch in her left groin. Interested in seeing what plastic surgery could offer. Denies any fever, chills. Recommend treating with ketoconazole for 2 weeks or until resolved. Advised to call if still not resolved after 3 weeks. Benefits, risks, possible drug interactions, and side effects of all new medications were reviewed with the patient. Pt verbalized understanding. Return to clinic: As needed    An electronic signature was used to authenticate this note. Dragan Ibarra MD  [facility]  2/25/2021    No future appointments. Subjective   Chief Complaint   Toe issue    Rosita Khan is a 63 y.o. female           Objective   Vitals:       Visit Vitals  /69   Pulse 75   Temp 99.1 °F (37.3 °C) (Oral)   Resp 16   Ht 5' 4\" (1.626 m)   Wt 184 lb (83.5 kg)   SpO2 99%   BMI 31.58 kg/m²        Physical Exam  Constitutional:       Appearance: Normal appearance. She is not ill-appearing. Cardiovascular:      Rate and Rhythm: Normal rate. Pulmonary:      Effort: No respiratory distress. Skin:     Comments: Groin-large slightly scaly patch with more erythematous border and pale anterior  Left middle toedark yellow/brown hard skin on plantar surface.   No significant erythema or purulent discharge noted.   Neurological:      Mental Status: She is alert. Current Outpatient Medications   Medication Sig    ketoconazole (NIZORAL) 2 % topical cream Apply  to affected area daily.  b complex vitamins tablet Take 1 Tab by mouth daily.  lisinopriL (PRINIVIL, ZESTRIL) 20 mg tablet Take 1 Tab by mouth daily.  DULoxetine (CYMBALTA) 60 mg capsule Take 120 mg by mouth daily.  vitamin E (AQUA GEMS) 400 unit capsule Take  by mouth daily. Indications: not taking    ascorbic acid, vitamin C, (VITAMIN C) 500 mg tablet Take  by mouth.  traZODone (DESYREL) 50 mg tablet Take 1 Tab by mouth as needed. Indications: not taking    LORazepam (ATIVAN) 1 mg tablet Take 1 mg by mouth every four (4) hours as needed for Anxiety. Indications: not taking    MULTIVIT &MINERALS/FERROUS FUM (MULTI VITAMIN PO) Take  by mouth daily.  zinc 50 mg tab tablet Take  by mouth daily.  ferrous sulfate (IRON) 325 mg (65 mg iron) tablet Take  by mouth Daily (before breakfast).  calcium-vitamin D 600 mg(1,500mg) -200 unit tab Take 1 tablet by mouth two (2) times daily (with meals).  BIOTIN PO Take  by mouth daily.  DOCOSAHEXANOIC ACID/EPA (FISH OIL PO) Take  by mouth daily. No current facility-administered medications for this visit.

## 2021-02-26 ENCOUNTER — TELEPHONE (OUTPATIENT)
Dept: INTERNAL MEDICINE CLINIC | Age: 61
End: 2021-02-26

## 2021-02-26 NOTE — TELEPHONE ENCOUNTER
----- Message from Wayne Vega sent at 2/26/2021 12:29 PM EST -----  Regarding: MD Freida/Telephone  General Message/Vendor Calls    Caller's first and last name: self. Reason for call: Requesting documents be faxed to specialist.      Gayle Lua required yes/no and why: Yes      Best contact number(s): [phone]      Details to clarify the request: Pt requesting documentation regarding reoccurring intertrigo be faxed to [facility] at [phone] to be submitted to insurance. Pt requesting to be contacted when completed.       Wayne Vega

## 2021-03-11 ENCOUNTER — OFFICE VISIT (OUTPATIENT)
Dept: OBGYN CLINIC | Age: 61
End: 2021-03-11

## 2021-03-11 VITALS
BODY MASS INDEX: 30.73 KG/M2 | HEIGHT: 64 IN | SYSTOLIC BLOOD PRESSURE: 124 MMHG | WEIGHT: 180 LBS | DIASTOLIC BLOOD PRESSURE: 67 MMHG

## 2021-03-11 DIAGNOSIS — Z01.419 ENCOUNTER FOR GYNECOLOGICAL EXAMINATION (GENERAL) (ROUTINE) WITHOUT ABNORMAL FINDINGS: Primary | ICD-10-CM

## 2021-03-11 PROCEDURE — 99396 PREV VISIT EST AGE 40-64: CPT | Performed by: OBSTETRICS & GYNECOLOGY

## 2021-03-11 NOTE — PROGRESS NOTES
Tremaine Frazier is a ,  63 y.o. female WHITE  who presents for her annual checkup. She is having no significant problems. Menstrual status:    Her periods are absent in flow. She denies dysmenorrhea. She reports no premenstrual symptoms. The patient is not using HRT. Contraception:    The current method of family planning is post menopausal status. Sexual history:    She  reports previously being sexually active. Medical conditions:    Since her last annual GYN exam about 20 ago, she has had the following changes in her health history: none. Pap and Mammogram History:    Her most recent Pap smear wasnormal obtained 19 year(s) ago. The patient had her mammogram today in our office. Breast Cancer History/Substance Abuse:    She has no family history of breast cancer. Osteoporosis History:    Family history does not include a first or second degree relative with osteopenia or osteoporosis. A bone density scan was not obtained. She is currently taking calcium and vit D. Past Medical History:   Diagnosis Date    Abnormal Pap smear h/o per pt- unknown age    s/p HANSEL for fibroid. normal     Benign essential HTN 3/28/2018    Chronic anxiety     Dr. Christal Mendoza.  Chronic depression     Dr. Jennifer Knight H/O gastric bypass     peak wt prior 312 lb, low 165 lb (?)    Hx of vertigo     did PT in the past    IFG (impaired fasting glucose)     a1c 5.8% 2013,     Left lumbar radiculopathy     xray showed DJD per pt    Neuropathy of foot     prior to gastric bypass. +FH,mildly painful, bilateral, stocking distribution, saw neurology in [place]    KEELEY on CPAP     dx in [place]. improved temporarily surgery  w weight loss.   she then used her sister CPAP -2018    Tinnitus of both ears     for years     Past Surgical History:   Procedure Laterality Date    HX CHOLECYSTECTOMY      HX COLONOSCOPY  14    normal.  repeat 3 yrs +FH,  in [place] (3rd colonoscopy)    HX COLONOSCOPY  2011?  HX COLONOSCOPY  2008?  HX COLONOSCOPY  10/10/2016    normal.  Dr. Hannah Lutz    HX COLONOSCOPY  10/14/2019    2 mm polyp. Dr. Claudio Keller. in chart. repeat 5 years (pt prefers 3 years)    HX GASTRIC BYPASS  2005    HX HYSTERECTOMY  2011 TVHper pt    due to fibroids, still has ovaries    HX ORTHOPAEDIC      middle finger right hand    HX ORTHOPAEDIC  2013    fx fibula right leg    HX ORTHOPAEDIC Right 06/2020    rotator cuff     HX TONSILLECTOMY  1964     Current Outpatient Medications   Medication Sig Dispense Refill    ketoconazole (NIZORAL) 2 % topical cream Apply  to affected area daily. 60 g 0    b complex vitamins tablet Take 1 Tab by mouth daily.  lisinopriL (PRINIVIL, ZESTRIL) 20 mg tablet Take 1 Tab by mouth daily. 30 Tab 2    DULoxetine (CYMBALTA) 60 mg capsule Take 120 mg by mouth daily.  vitamin E (AQUA GEMS) 400 unit capsule Take  by mouth daily. Indications: not taking      ascorbic acid, vitamin C, (VITAMIN C) 500 mg tablet Take  by mouth.  traZODone (DESYREL) 50 mg tablet Take 1 Tab by mouth as needed. Indications: not taking      LORazepam (ATIVAN) 1 mg tablet Take 1 mg by mouth every four (4) hours as needed for Anxiety. Indications: not taking      MULTIVIT &MINERALS/FERROUS FUM (MULTI VITAMIN PO) Take  by mouth daily.  zinc 50 mg tab tablet Take  by mouth daily.  ferrous sulfate (IRON) 325 mg (65 mg iron) tablet Take  by mouth Daily (before breakfast).  calcium-vitamin D 600 mg(1,500mg) -200 unit tab Take 1 tablet by mouth two (2) times daily (with meals).  BIOTIN PO Take  by mouth daily.  DOCOSAHEXANOIC ACID/EPA (FISH OIL PO) Take  by mouth daily.        Allergies: Prednisone and Wellbutrin [bupropion hcl]   Social History     Socioeconomic History    Marital status: SINGLE     Spouse name:  2014    Number of children: 1    Years of education: Not on file    Highest education level: Not on file   Occupational History    Occupation: [employer] [address] [employer] Needs    Financial resource strain: Not on file    Food insecurity     Worry: Not on file     Inability: Not on file    Transportation needs     Medical: Not on file     Non-medical: Not on file   Tobacco Use    Smoking status: Never Smoker    Smokeless tobacco: Never Used   Substance and Sexual Activity    Alcohol use: Yes     Alcohol/week: 10.0 standard drinks     Types: 4 Shots of liquor, 6 Glasses of wine per week    Drug use: No    Sexual activity: Not Currently   Lifestyle    Physical activity     Days per week: Not on file     Minutes per session: Not on file    Stress: Not on file   Relationships    Social connections     Talks on phone: Not on file     Gets together: Not on file     Attends Bahai service: Not on file     Active member of club or organization: Not on file     Attends meetings of clubs or organizations: Not on file     Relationship status: Not on file    Intimate partner violence     Fear of current or ex partner: Not on file     Emotionally abused: Not on file     Physically abused: Not on file     Forced sexual activity: Not on file   Other Topics Concern    Not on file   Social History Narrative    Son in [place]. Age 32.  she  Moved to [place] 2/2014     Tobacco History:  reports that she has never smoked. She has never used smokeless tobacco.  Alcohol Abuse:  reports current alcohol use of about 10.0 standard drinks of alcohol per week. Drug Abuse:  reports no history of drug use.   Patient Active Problem List   Diagnosis Code    Chronic anxiety F41.9    Chronic depression F32.9    KEELEY on CPAP G47.33, Z99.89    Neuropathy of foot G57.90    H/O gastric bypass Z98.84    IFG (impaired fasting glucose) R73.01    Left lumbar radiculopathy M54.16    Tinnitus of both ears H93.13    Benign essential HTN I10         Review of Systems - History obtained from the patient  Constitutional: negative for weight loss, fever, night sweats  HEENT: negative for hearing loss, earache, congestion, snoring, sorethroat  CV: negative for chest pain, palpitations, edema  Resp: negative for cough, shortness of breath, wheezing  GI: negative for change in bowel habits, abdominal pain, black or bloody stools  : negative for frequency, dysuria, hematuria, vaginal discharge  MSK: negative for back pain, joint pain, muscle pain  Breast: negative for breast lumps, nipple discharge, galactorrhea  Skin :negative for itching, rash, hives  Neuro: negative for dizziness, headache, confusion, weakness  Psych: negative for anxiety, depression, change in mood  Heme/lymph: negative for bleeding, bruising, pallor    Physical Exam    Visit Vitals  /67   Ht 5' 4\" (1.626 m)   Wt 180 lb (81.6 kg)   BMI 30.90 kg/m²     Constitutional  · Appearance: well-nourished, well developed, alert, in no acute distress    HENT  · Head and Face: appears normal    Neck  · Inspection/Palpation: normal appearance, no masses or tenderness  · Lymph Nodes: no lymphadenopathy present  · Thyroid: gland size normal, nontender, no nodules or masses present on palpation    Chest  · Respiratory Effort: breathing normal  · Auscultation: normal breath sounds    Cardiovascular  · Heart:  · Auscultation: regular rate and rhythm without murmur    Breasts  · Inspection of Breasts: breasts symmetrical, no skin changes, no discharge present, nipple appearance normal, no skin retraction present  · Palpation of Breasts and Axillae: no masses present on palpation, no breast tenderness  · Axillary Lymph Nodes: no lymphadenopathy present    Gastrointestinal  · Abdominal Examination: abdomen non-tender to palpation, normal bowel sounds, no masses present  · Liver and spleen: no hepatomegaly present, spleen not palpable  · Hernias: no hernias identified    Skin  · General Inspection: no rash, no lesions identified    Neurologic/Psychiatric  · Mental Status:  · Orientation: grossly oriented to person, place and time  · Mood and Affect: mood normal, affect appropriate    Genitourinary  · External Genitalia: normal appearance for age, no discharge present, no tenderness present, no inflammatory lesions present, no masses present, no atrophy present  · Vagina: normal vaginal vault without central or paravaginal defects, no discharge present, no inflammatory lesions present, no masses present  · Bladder: non-tender to palpation  · Urethra: appears normal  · Cervix: normal   · Uterus: normal size, shape and consistency  · Adnexa: no adnexal tenderness present, no adnexal masses present  · Perineum: perineum within normal limits, no evidence of trauma, no rashes or skin lesions present  · Anus: anus within normal limits, no hemorrhoids present  · Inguinal Lymph Nodes: no lymphadenopathy present    Assessment:  Routine gynecologic examination  Her current medical status is satisfactory with no evidence of significant gynecologic issues.   BMD 2020 Frax 19% risk of fracture  Plan:  Counseled re: diet, exercise, healthy lifestyle  Return for yearly wellness visits  Rec annual mammogram  BMD next year

## 2021-03-15 ENCOUNTER — IMMUNIZATION (OUTPATIENT)
Dept: INTERNAL MEDICINE CLINIC | Age: 61
End: 2021-03-15
Payer: MEDICAID

## 2021-03-15 DIAGNOSIS — Z23 ENCOUNTER FOR IMMUNIZATION: Primary | ICD-10-CM

## 2021-03-15 PROCEDURE — 91300 COVID-19, MRNA, LNP-S, PF, 30MCG/0.3ML DOSE(PFIZER): CPT | Performed by: FAMILY MEDICINE

## 2021-03-15 PROCEDURE — 0001A COVID-19, MRNA, LNP-S, PF, 30MCG/0.3ML DOSE(PFIZER): CPT | Performed by: FAMILY MEDICINE

## 2021-04-05 ENCOUNTER — IMMUNIZATION (OUTPATIENT)
Dept: INTERNAL MEDICINE CLINIC | Age: 61
End: 2021-04-05
Payer: MEDICAID

## 2021-04-05 DIAGNOSIS — Z23 ENCOUNTER FOR IMMUNIZATION: Primary | ICD-10-CM

## 2021-04-05 PROCEDURE — 0002A COVID-19, MRNA, LNP-S, PF, 30MCG/0.3ML DOSE(PFIZER): CPT | Performed by: FAMILY MEDICINE

## 2021-04-05 PROCEDURE — 91300 COVID-19, MRNA, LNP-S, PF, 30MCG/0.3ML DOSE(PFIZER): CPT | Performed by: FAMILY MEDICINE

## 2021-05-07 ENCOUNTER — TELEPHONE (OUTPATIENT)
Dept: INTERNAL MEDICINE CLINIC | Age: 61
End: 2021-05-07

## 2021-05-07 DIAGNOSIS — L81.9 DECOLORATION SKIN: Primary | ICD-10-CM

## 2021-05-07 DIAGNOSIS — Z01.00 EXAMINATION OF EYES AND VISION: Primary | ICD-10-CM

## 2021-05-07 NOTE — TELEPHONE ENCOUNTER
----- Message from Kush Oconnor sent at 5/7/2021  1:48 PM EDT -----  Regarding: FW: Referral Request  Contact: [phone]    ----- Message -----  From: Shilpa   Sent: 5/7/2021  12:11 PM EDT  To: [facility]es Pool  Subject: Referral Request                                 Please send a referral to [place] [address] [facility], Dr. Logan Salmeron. I have an appointment on Thursday May 13 at 8 AM.  I have a couple of suspicious spots on my left arm. I don't have my new patient exam until October, but the dermatology clinic can see me next week.   It is [address]   The phone number is [phone]  Thanks,  Ben Shabazz

## 2021-05-07 NOTE — TELEPHONE ENCOUNTER
----- Message from Miguelrivka Perkinscil Wears sent at 5/7/2021  1:47 PM EDT -----  Regarding: FW: Referral Request  Contact: [phone]    ----- Message -----  From: Cyndee Mendoza  Sent: 5/7/2021  12:07 PM EDT  To: [facility]  Subject: RE: Referral Request                             Hi- Please send a referral to [facility], Dr. Heidi Boles. Fax [phone]. My appointment is Wednesday, May 12 at 10 am.  Thank you.

## 2021-05-07 NOTE — TELEPHONE ENCOUNTER
No insurance is required from [facility].   I have faxed the order from Dr. Sonja Zendejas referring to Ophthalmology at fax # [phone]

## 2021-05-07 NOTE — TELEPHONE ENCOUNTER
No referral is required by [facility] faxed the order from Dr. Sommer Brown 44 referring to Dermatology at fax number [phone]

## 2021-09-21 ENCOUNTER — TELEPHONE (OUTPATIENT)
Dept: INTERNAL MEDICINE CLINIC | Age: 61
End: 2021-09-21

## 2021-09-21 NOTE — TELEPHONE ENCOUNTER
Patient called in to state they feel feverish and look pale. They are currently running a temperature of 99 degrees and are wondering at what point should they get tested for covid. Please call back and advise.

## 2021-09-21 NOTE — TELEPHONE ENCOUNTER
Pt states she is preparing to move and has been very busy cleaning out her house nonstop and has began to feel feverish yesterday she states her Sister law said she was looking pale   Pt temp has been   98.9   99  Pt wants to know should she get tested for covid , pt has had no know exposure has been keeping her distance from others. Pt states she does go to the dog park quite often but does not wear a mask since they're outside. Pt is fully vaccinated advise pt she should monitor her sx , since she has had no known exposure and since she is fully vaccinated to wait 5 days to get tested , advise pt if her temp is above 100.5 to get tested or if  her sx worsen. Informed pt she may be very worn out from all the activities she has been doing in regards to moving out. Pt advise to also stay hydrated and drink plenty of water. Take tylenol if needed. Pt verbalized understanding of information discussed w/ no further questions at this time.

## 2021-10-31 DIAGNOSIS — I10 BENIGN ESSENTIAL HTN: ICD-10-CM

## 2021-10-31 RX ORDER — LISINOPRIL 20 MG/1
TABLET ORAL
Qty: 90 TABLET | Refills: 1 | Status: SHIPPED | OUTPATIENT
Start: 2021-10-31

## 2021-11-10 ENCOUNTER — OFFICE VISIT (OUTPATIENT)
Dept: INTERNAL MEDICINE CLINIC | Age: 61
End: 2021-11-10
Payer: MEDICAID

## 2021-11-10 VITALS
SYSTOLIC BLOOD PRESSURE: 119 MMHG | RESPIRATION RATE: 15 BRPM | OXYGEN SATURATION: 98 % | BODY MASS INDEX: 30.9 KG/M2 | TEMPERATURE: 99 F | HEIGHT: 64 IN | DIASTOLIC BLOOD PRESSURE: 71 MMHG | HEART RATE: 78 BPM

## 2021-11-10 DIAGNOSIS — I10 BENIGN ESSENTIAL HTN: ICD-10-CM

## 2021-11-10 DIAGNOSIS — Z00.00 PREVENTATIVE HEALTH CARE: Primary | ICD-10-CM

## 2021-11-10 DIAGNOSIS — Z98.84 H/O GASTRIC BYPASS: ICD-10-CM

## 2021-11-10 DIAGNOSIS — M18.12 ARTHRITIS OF CARPOMETACARPAL (CMC) JOINT OF LEFT THUMB: ICD-10-CM

## 2021-11-10 DIAGNOSIS — R73.01 IFG (IMPAIRED FASTING GLUCOSE): ICD-10-CM

## 2021-11-10 DIAGNOSIS — E61.1 IRON DEFICIENCY: ICD-10-CM

## 2021-11-10 DIAGNOSIS — S92.354D CLOSED NONDISPLACED FRACTURE OF FIFTH METATARSAL BONE OF RIGHT FOOT WITH ROUTINE HEALING, SUBSEQUENT ENCOUNTER: ICD-10-CM

## 2021-11-10 DIAGNOSIS — E55.9 VITAMIN D DEFICIENCY: ICD-10-CM

## 2021-11-10 PROBLEM — S92.309A METATARSAL BONE FRACTURE: Status: ACTIVE | Noted: 2021-10-01

## 2021-11-10 PROCEDURE — 99213 OFFICE O/P EST LOW 20 MIN: CPT | Performed by: INTERNAL MEDICINE

## 2021-11-10 PROCEDURE — 99396 PREV VISIT EST AGE 40-64: CPT | Performed by: INTERNAL MEDICINE

## 2021-11-10 RX ORDER — BUPROPION HYDROCHLORIDE 100 MG/1
TABLET, EXTENDED RELEASE ORAL 2 TIMES DAILY
COMMUNITY
Start: 2021-07-18

## 2021-11-10 NOTE — PATIENT INSTRUCTIONS
Past Medical History:   Diagnosis Date    Abnormal Pap smear h/o per pt- unknown age    s/p HANSEL for fibroid. normal 2014    Arthritis of carpometacarpal [place]) joint of left thumb     Benign essential HTN 3/28/2018    Chronic anxiety     Dr. Kojo Trevizo.  Chronic depression     Dr. Junior Venegas H/O gastric bypass 2005    peak wt prior 312 lb, low 165 lb (2011?)    Hx of vertigo     did PT in the past    IFG (impaired fasting glucose)     a1c 5.8% 12/2013, 2014    Left lumbar radiculopathy     xray showed DJD per pt    Metatarsal bone fracture 10/2021    R 5th metatarsal    Neuropathy of foot     prior to gastric bypass. +FH,mildly painful, bilateral, stocking distribution, saw neurology in [place]    KEELEY on CPAP 1994    dx in [place]. improved temporarily surgery 2005 w weight loss. she then used her sister CPAP 2013-1/2018    Tinnitus of both ears     for years     Past Surgical History:   Procedure Laterality Date    HX CHOLECYSTECTOMY  2009    HX COLONOSCOPY  1/22/14    normal.  repeat 3 yrs +FH,  in [place] (3rd colonoscopy)    HX COLONOSCOPY  2011?  HX COLONOSCOPY  2008?  HX COLONOSCOPY  10/10/2016    normal.  Dr. Sophie Espinoza    HX COLONOSCOPY  10/14/2019    2 mm polyp. Dr. Odilon Cazares. in chart. repeat 5 years (pt prefers 3 years)    HX GASTRIC BYPASS  2005    HX HYSTERECTOMY  2011    TVR. due to fibroids, still has ovaries    HX ORTHOPAEDIC      middle finger right hand    HX ORTHOPAEDIC  2013    fx fibula right leg    HX OTHER SURGICAL  10/18/2021    broken metatarsal bone - right    HX ROTATOR CUFF REPAIR Right 06/2020    HX TONSILLECTOMY  1964       Current Outpatient Medications   Medication Sig    buPROPion SR (WELLBUTRIN SR) 100 mg SR tablet two (2) times a day.  lisinopriL (PRINIVIL, ZESTRIL) 20 mg tablet TAKE 1 TABLET BY MOUTH EVERY DAY    ketoconazole (NIZORAL) 2 % topical cream Apply  to affected area daily.  b complex vitamins tablet Take 1 Tab by mouth daily.     DULoxetine (CYMBALTA) 60 mg capsule Take 120 mg by mouth daily.  vitamin E (AQUA GEMS) 400 unit capsule Take  by mouth daily. Indications: not taking    ascorbic acid, vitamin C, (VITAMIN C) 500 mg tablet Take  by mouth.  traZODone (DESYREL) 50 mg tablet Take 1 Tab by mouth as needed. Indications: not taking    LORazepam (ATIVAN) 1 mg tablet Take 1 mg by mouth every four (4) hours as needed for Anxiety. Indications: not taking    MULTIVIT &MINERALS/FERROUS FUM (MULTI VITAMIN PO) Take  by mouth daily.  zinc 50 mg tab tablet Take  by mouth daily.  ferrous sulfate (IRON) 325 mg (65 mg iron) tablet Take  by mouth Daily (before breakfast).  calcium-vitamin D 600 mg(1,500mg) -200 unit tab Take 1 tablet by mouth two (2) times daily (with meals).  BIOTIN PO Take  by mouth daily.  DOCOSAHEXANOIC ACID/EPA (FISH OIL PO) Take  by mouth daily. No current facility-administered medications for this visit.      Allergies   Allergen Reactions    Prednisone Other (comments)     Pt feels like she has an \"out of body\" experience       Immunization History   Administered Date(s) Administered    COVID-19, PFIZER, MRNA, LNP-S, PF, 30MCG/0.3ML DOSE 03/15/2021, 04/05/2021, 10/06/2021    Influenza Vaccine 09/25/2014, 10/07/2015, 09/14/2016, 09/14/2016, 10/01/2020, 10/06/2021    Td 07/02/2013    Tdap 07/05/2013    Zoster Recombinant 10/18/2020, 12/17/2020

## 2021-11-10 NOTE — PROGRESS NOTES
Kenzie Addison is a 57 y.o. female  Presenting for her annual checkup and follow-up  She is actually moving to [facility]. tomorrow. Her son, sister and best friend live up there. Her brother is here, but she needs a change. She is very excited to move. Wearing a boot on her right foot because of a right fifth metatarsal fracture. Occurred while she was dealing with her dog at night and stepped off of a curb and heard a crack. Ambulating with the boot. Reports left base of thumb discomfort. Onset was a couple of weeks ago. Mild. No swelling. Hypertension  Hypertension ROS: taking medications as instructed, no medication side effects noted, no TIA's, no chest pain on exertion, no dyspnea on exertion, no swelling of ankles     reports that she has never smoked. She has never used smokeless tobacco.    reports current alcohol use of about 10.0 standard drinks of alcohol per week. BP Readings from Last 2 Encounters:   11/10/21 119/71   03/11/21 124/67     Impaired fasting glucose / Pre-diabetes follow-up  Lab Results   Component Value Date/Time    Glucose 86 02/10/2021 02:23 PM   Last hemoglobin a1c   Lab Results   Component Value Date/Time    Hemoglobin A1c 5.3 02/10/2021 02:23 PM   Diabetic diet compliance: compliant most of the time. Patient does not perform home glucose monitoring. History of gastric bypass. Taking supplements as listed. Would like vitamin screening.   Wt Readings from Last 3 Encounters:   03/11/21 180 lb (81.6 kg)   02/25/21 184 lb (83.5 kg)   02/10/21 187 lb 12.8 oz (85.2 kg)         Health Maintenance   Topic Date Due    A1C test (Diabetic or Prediabetic)  02/10/2022    Breast Cancer Screen Mammogram  03/11/2023    DTaP/Tdap/Td series (2 - Td or Tdap) 07/05/2023    Colorectal Cancer Screening Combo  10/14/2024    Lipid Screen  02/10/2026    Hepatitis C Screening  Completed    Bone Densitometry (Dexa) Screening  Completed    Shingrix Vaccine Age 50>  Completed    Flu Vaccine  Completed    COVID-19 Vaccine  Completed    Pneumococcal 0-64 years  Aged Out       Exercise: moderately active  Diet: generally follows a low fat low cholesterol diet    Vaccinations reviewed  Immunization History   Administered Date(s) Administered    COVID-19, PFIZER, MRNA, LNP-S, PF, 30MCG/0.3ML DOSE 03/15/2021, 04/05/2021, 10/06/2021    Influenza Vaccine 09/25/2014, 10/07/2015, 09/14/2016, 09/14/2016, 10/01/2020, 10/06/2021    Td 07/02/2013    Tdap 07/05/2013    Zoster Recombinant 10/18/2020, 12/17/2020       Allergies: Prednisone  Current Outpatient Medications   Medication Sig    buPROPion SR (WELLBUTRIN SR) 100 mg SR tablet two (2) times a day.  lisinopriL (PRINIVIL, ZESTRIL) 20 mg tablet TAKE 1 TABLET BY MOUTH EVERY DAY    ketoconazole (NIZORAL) 2 % topical cream Apply  to affected area daily.  b complex vitamins tablet Take 1 Tab by mouth daily.  DULoxetine (CYMBALTA) 60 mg capsule Take 120 mg by mouth daily.  vitamin E (AQUA GEMS) 400 unit capsule Take  by mouth daily. Indications: not taking    ascorbic acid, vitamin C, (VITAMIN C) 500 mg tablet Take  by mouth.  traZODone (DESYREL) 50 mg tablet Take 1 Tab by mouth as needed. Indications: not taking    LORazepam (ATIVAN) 1 mg tablet Take 1 mg by mouth every four (4) hours as needed for Anxiety. Indications: not taking    MULTIVIT &MINERALS/FERROUS FUM (MULTI VITAMIN PO) Take  by mouth daily.  zinc 50 mg tab tablet Take  by mouth daily.  ferrous sulfate (IRON) 325 mg (65 mg iron) tablet Take  by mouth Daily (before breakfast).  calcium-vitamin D 600 mg(1,500mg) -200 unit tab Take 1 tablet by mouth two (2) times daily (with meals).  BIOTIN PO Take  by mouth daily.  DOCOSAHEXANOIC ACID/EPA (FISH OIL PO) Take  by mouth daily. No current facility-administered medications for this visit.       has a past medical history of Abnormal Pap smear (h/o per pt- unknown age), Arthritis of carpometacarpal [place]) joint of left thumb, Benign essential HTN (3/28/2018), Chronic anxiety, Chronic depression, H/O gastric bypass (2005), vertigo, IFG (impaired fasting glucose), Left lumbar radiculopathy, Metatarsal bone fracture (10/2021), Neuropathy of foot, KEELEY on CPAP (1994), and Tinnitus of both ears. Past Surgical History:   Procedure Laterality Date    HX CHOLECYSTECTOMY  2009    HX COLONOSCOPY  1/22/14    normal.  repeat 3 yrs +FH,  in [place] (3rd colonoscopy)    HX COLONOSCOPY  2011?  HX COLONOSCOPY  2008?  HX COLONOSCOPY  10/10/2016    normal.  Dr. Arlis Opitz    HX COLONOSCOPY  10/14/2019    2 mm polyp. Dr. Antoinette Cifuentes. in chart. repeat 5 years (pt prefers 3 years)    HX GASTRIC BYPASS  2005    HX HYSTERECTOMY  2011    TVR. due to fibroids, still has ovaries    HX ORTHOPAEDIC      middle finger right hand    HX ORTHOPAEDIC  2013    fx fibula right leg    HX OTHER SURGICAL  10/18/2021    broken metatarsal bone - right    HX ROTATOR CUFF REPAIR Right 06/2020    HX TONSILLECTOMY  1964      Social History     Socioeconomic History    Marital status: SINGLE     Spouse name:  2014    Number of children: 1    Years of education: Not on file    Highest education level: Not on file   Occupational History    Occupation: [employer]. 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  Age 32.  she  Moved to [facility] 2/2014     Social Determinants of Health     Financial Resource Strain:     Difficulty of Paying Living Expenses: Not on file   Food Insecurity:     Worried About Running Out of Food in the Last Year: Not on file    Caryn of Food in the Last Year: Not on file   Transportation Needs:     Lack of Transportation (Medical): Not on file    Lack of Transportation (Non-Medical): Not on file   Physical Activity:     Days of Exercise per Week: Not on file    Minutes of Exercise per Session: Not on file   Stress:     Feeling of Stress : Not on file   Social Connections:     Frequency of Communication with Friends and Family: Not on file    Frequency of Social Gatherings with Friends and Family: Not on file    Attends Sabianism Services: Not on file    Active Member of Clubs or Organizations: Not on file    Attends Club or Organization Meetings: Not on file    Marital Status: Not on file   Intimate Partner Violence:     Fear of Current or Ex-Partner: Not on file    Emotionally Abused: Not on file    Physically Abused: Not on file    Sexually Abused: Not on file   Housing Stability:     Unable to Pay for Housing in the Last Year: Not on file    Number of [place] in the Last Year: Not on file    Unstable Housing in the Last Year: Not on file     Family History   Problem Relation Age of Onset    Colon Cancer Mother 64        lung    Diabetes Mother     Heart Disease Mother         CHF    Psychiatric Disorder Mother     Hypertension Mother    Tubbs Parkinsonism Father     Dementia Father     Hypertension Father     Hypertension Sister     Depression Sister     Heart Disease Brother     High Cholesterol Brother     Stroke Brother         10/30/21    Depression Son        Review of Systems - History obtained from the patient  General ROS: negative for - night sweats, weight gain or weight loss  Cardiovascular ROS: no chest pain, dyspnea on exertion, edema  GYN ROS: no breast pain or new or enlarging lumps on self exam, no vaginal bleeding. Physical exam  Blood pressure 119/71, pulse 78, temperature 99 °F (37.2 °C), temperature source Oral, resp. rate 15, height 5' 4\" (1.626 m), SpO2 98 %.   Wt Readings from Last 3 Encounters:   03/11/21 180 lb (81.6 kg)   02/25/21 184 lb (83.5 kg)   02/10/21 187 lb 12.8 oz (85.2 kg)     she appears well, alert and oriented x 3, pleasant and cooperative. Vitals as noted. No rashes or significant lesions. Neck supple and free of adenopathy, or masses. No thyromegaly or carotid bruits. Cranial nerves normal. Lungs are clear to auscultation. Heart sounds are normal with no murmurs, clicks, gallops or rubs. Abdomen is soft, non- tender, with no masses or organomegaly. Extremities, peripheral pulses and reflexes are normal.  . Pain in left CMC joint. Full range of motion. No palpable abnormalities or swelling. BREAST EXAM: not examined  PELVIC EXAM: examination not indicated      Diagnoses and all orders for this visit:    1. Preventative health care  She is up-to-date on all preventative services. -     LIPID PANEL; Future  -     CBC W/O DIFF; Future  -     METABOLIC PANEL, COMPREHENSIVE; Future    2. Benign essential HTN  This condition is controlled on current medication regimen as written in medication list.  Medications refilled. 3. IFG (impaired fasting glucose)  The patient is asked to make an attempt to improve diet and exercise patterns to aid in medical management of this problem  -     HEMOGLOBIN A1C WITH EAG; Future    4. H/O gastric bypass    5. Vitamin D deficiency  -     VITAMIN D, 25 HYDROXY; Future    6. Iron deficiency  -     FERRITIN; Future    7. Arthritis of carpometacarpal (CMC) joint of left thumb  Reassured. Purchase a thumb brace and wear it as much as possible for 1 month. Can try diclofenac gel. Consider consultation with a hand specialist for injection and x-rays once she moved to [facility]. 8. Closed nondisplaced fracture of fifth metatarsal bone of right foot with routine healing, subsequent encounter  Wearing a boot as recommended by orthopedics. She will follow-up there.       I have printed up some of her past medical history and given it to her

## 2021-11-11 LAB
25(OH)D3 SERPL-MCNC: 54.5 NG/ML (ref 30–100)
ALBUMIN SERPL-MCNC: 3.9 G/DL (ref 3.5–5)
ALBUMIN/GLOB SERPL: 1.4 {RATIO} (ref 1.1–2.2)
ALP SERPL-CCNC: 61 U/L (ref 45–117)
ALT SERPL-CCNC: 47 U/L (ref 12–78)
ANION GAP SERPL CALC-SCNC: 4 MMOL/L (ref 5–15)
AST SERPL-CCNC: 26 U/L (ref 15–37)
BILIRUB SERPL-MCNC: 0.6 MG/DL (ref 0.2–1)
BUN SERPL-MCNC: 33 MG/DL (ref 6–20)
BUN/CREAT SERPL: 29 (ref 12–20)
CALCIUM SERPL-MCNC: 9.9 MG/DL (ref 8.5–10.1)
CHLORIDE SERPL-SCNC: 105 MMOL/L (ref 97–108)
CHOLEST SERPL-MCNC: 269 MG/DL
CO2 SERPL-SCNC: 29 MMOL/L (ref 21–32)
CREAT SERPL-MCNC: 1.14 MG/DL (ref 0.55–1.02)
ERYTHROCYTE [DISTWIDTH] IN BLOOD BY AUTOMATED COUNT: 13.8 % (ref 11.5–14.5)
EST. AVERAGE GLUCOSE BLD GHB EST-MCNC: 114 MG/DL
FERRITIN SERPL-MCNC: 449 NG/ML (ref 26–388)
GLOBULIN SER CALC-MCNC: 2.7 G/DL (ref 2–4)
GLUCOSE SERPL-MCNC: 90 MG/DL (ref 65–100)
HBA1C MFR BLD: 5.6 % (ref 4–5.6)
HCT VFR BLD AUTO: 37.4 % (ref 35–47)
HDLC SERPL-MCNC: 94 MG/DL
HDLC SERPL: 2.9 {RATIO} (ref 0–5)
HGB BLD-MCNC: 11.9 G/DL (ref 11.5–16)
LDLC SERPL CALC-MCNC: 159.2 MG/DL (ref 0–100)
MCH RBC QN AUTO: 30.5 PG (ref 26–34)
MCHC RBC AUTO-ENTMCNC: 31.8 G/DL (ref 30–36.5)
MCV RBC AUTO: 95.9 FL (ref 80–99)
NRBC # BLD: 0 K/UL (ref 0–0.01)
NRBC BLD-RTO: 0 PER 100 WBC
PLATELET # BLD AUTO: 259 K/UL (ref 150–400)
PMV BLD AUTO: 10.6 FL (ref 8.9–12.9)
POTASSIUM SERPL-SCNC: 4.2 MMOL/L (ref 3.5–5.1)
PROT SERPL-MCNC: 6.6 G/DL (ref 6.4–8.2)
RBC # BLD AUTO: 3.9 M/UL (ref 3.8–5.2)
SODIUM SERPL-SCNC: 138 MMOL/L (ref 136–145)
TRIGL SERPL-MCNC: 79 MG/DL (ref ?–150)
VLDLC SERPL CALC-MCNC: 15.8 MG/DL
WBC # BLD AUTO: 6.6 K/UL (ref 3.6–11)

## 2022-03-19 PROBLEM — I10 BENIGN ESSENTIAL HTN: Status: ACTIVE | Noted: 2018-03-28

## 2022-03-19 PROBLEM — S92.309A METATARSAL BONE FRACTURE: Status: ACTIVE | Noted: 2021-10-01

## 2023-05-30 RX ORDER — BUPROPION HYDROCHLORIDE 100 MG/1
TABLET, EXTENDED RELEASE ORAL 2 TIMES DAILY
COMMUNITY
Start: 2021-07-18

## 2023-05-30 RX ORDER — TRAZODONE HYDROCHLORIDE 50 MG/1
1 TABLET ORAL PRN
COMMUNITY
Start: 2018-02-22

## 2023-05-30 RX ORDER — KETOCONAZOLE 20 MG/G
CREAM TOPICAL DAILY
COMMUNITY
Start: 2021-02-25

## 2023-05-30 RX ORDER — LISINOPRIL 20 MG/1
1 TABLET ORAL DAILY
COMMUNITY
Start: 2021-10-31

## 2023-05-30 RX ORDER — LORAZEPAM 1 MG/1
1 TABLET ORAL EVERY 4 HOURS PRN
COMMUNITY

## 2023-05-30 RX ORDER — DULOXETIN HYDROCHLORIDE 60 MG/1
120 CAPSULE, DELAYED RELEASE ORAL DAILY
COMMUNITY

## 2023-05-30 RX ORDER — B-COMPLEX WITH VITAMIN C
1 TABLET ORAL 2 TIMES DAILY WITH MEALS
COMMUNITY

## 2023-05-30 RX ORDER — FERROUS SULFATE 325(65) MG
TABLET ORAL
COMMUNITY

## 2023-05-30 RX ORDER — ASCORBIC ACID 500 MG
TABLET ORAL
COMMUNITY